# Patient Record
Sex: MALE | Race: BLACK OR AFRICAN AMERICAN | Employment: UNEMPLOYED | URBAN - METROPOLITAN AREA
[De-identification: names, ages, dates, MRNs, and addresses within clinical notes are randomized per-mention and may not be internally consistent; named-entity substitution may affect disease eponyms.]

---

## 2017-08-16 ENCOUNTER — TRANSFERRED RECORDS (OUTPATIENT)
Dept: HEALTH INFORMATION MANAGEMENT | Facility: CLINIC | Age: 57
End: 2017-08-16

## 2020-06-15 ENCOUNTER — ANCILLARY PROCEDURE (OUTPATIENT)
Dept: GENERAL RADIOLOGY | Facility: CLINIC | Age: 60
End: 2020-06-15
Attending: FAMILY MEDICINE
Payer: COMMERCIAL

## 2020-06-15 ENCOUNTER — OFFICE VISIT (OUTPATIENT)
Dept: ORTHOPEDICS | Facility: CLINIC | Age: 60
End: 2020-06-15
Payer: COMMERCIAL

## 2020-06-15 VITALS
WEIGHT: 248 LBS | SYSTOLIC BLOOD PRESSURE: 138 MMHG | DIASTOLIC BLOOD PRESSURE: 82 MMHG | BODY MASS INDEX: 36.73 KG/M2 | HEIGHT: 69 IN

## 2020-06-15 DIAGNOSIS — M17.0 PRIMARY OSTEOARTHRITIS OF BOTH KNEES: Primary | ICD-10-CM

## 2020-06-15 DIAGNOSIS — E66.01 MORBID OBESITY (H): ICD-10-CM

## 2020-06-15 DIAGNOSIS — M25.561 BILATERAL KNEE PAIN: ICD-10-CM

## 2020-06-15 DIAGNOSIS — M25.562 BILATERAL KNEE PAIN: ICD-10-CM

## 2020-06-15 PROCEDURE — 73562 X-RAY EXAM OF KNEE 3: CPT | Mod: 59 | Performed by: INTERNAL MEDICINE

## 2020-06-15 PROCEDURE — 20610 DRAIN/INJ JOINT/BURSA W/O US: CPT | Mod: 50 | Performed by: FAMILY MEDICINE

## 2020-06-15 PROCEDURE — 99204 OFFICE O/P NEW MOD 45 MIN: CPT | Mod: 25 | Performed by: FAMILY MEDICINE

## 2020-06-15 RX ORDER — TRIAMCINOLONE ACETONIDE 40 MG/ML
40 INJECTION, SUSPENSION INTRA-ARTICULAR; INTRAMUSCULAR
Status: DISCONTINUED | OUTPATIENT
Start: 2020-06-15 | End: 2022-12-12

## 2020-06-15 RX ORDER — ROPIVACAINE HYDROCHLORIDE 5 MG/ML
3 INJECTION, SOLUTION EPIDURAL; INFILTRATION; PERINEURAL
Status: DISCONTINUED | OUTPATIENT
Start: 2020-06-15 | End: 2022-12-13

## 2020-06-15 RX ADMIN — TRIAMCINOLONE ACETONIDE 40 MG: 40 INJECTION, SUSPENSION INTRA-ARTICULAR; INTRAMUSCULAR at 11:22

## 2020-06-15 RX ADMIN — ROPIVACAINE HYDROCHLORIDE 3 ML: 5 INJECTION, SOLUTION EPIDURAL; INFILTRATION; PERINEURAL at 11:22

## 2020-06-15 ASSESSMENT — MIFFLIN-ST. JEOR: SCORE: 1925.3

## 2020-06-15 NOTE — PROGRESS NOTES
HPI   [facility]   Clinic Visit s Nathanael 15, 2020    PCP: Faisal Hsu    ASSESSMENT/PLAN    ICD-10-CM    1. Primary osteoarthritis of both knees  M17.0    2. Morbid obesity (H)  E66.01      Reviewed nature of degenerative arthritis, discussed options including joint protection strategies and symptom management, including NSAIDS,  acetaminophen on a scheduled and/or as needed basis, joint injection with cortisone or hyaluronic acid derivatives, bracing, glucosamine, maintenance of overall knee stability through strengthening through physical therapy.  Pt opts for  symptom treatment, activity modification/rest and injection therapy.    Left worse than right knee    Declines physical therapy or unloading brace trial    Discussed lifestyles changes to reduce risks of arthritis progression:  Stop using tobacco if any current use.  Lose weight  Exercise safely.    Today's Visit:  Aashish is a 59 year old male who is seen as self referral for   Chief Complaint   Patient presents with     Right Knee - Pain     Left Knee - Pain       Injury: Reports insidious onset without acute precipitating event.  He reports chronic knee pain. He has had steroid injections in the past 1 year ago at [facility] for 6-8 months. His pain increases walking.    Location of Pain: bilateral knee anterior , nonradiating   Duration of Pain: chronic, worsening, 6 month(s),   Rating of Pain at worst: 8/10  Rating of Pain Currently: 4/10  Pain is better with: rest, ibuprofen and tylenol   Pain is worse with: walking   Treatment so far consists of: injection:  Cortisone  (most recent date: 1 year ago) that provided  6-8 month(s) of relief  Associated symptoms: swelling Mild  Recent imaging completed: No recent imaging completed.  Prior History of related problems: Chronic knee pain    Chart review indicates cortisone injections roughly yearly for the past few years.  He is also had a Eufflexxa trial, but he does not  recall the results of that.    Social History: retired       Patient Active Problem List    Diagnosis Date Noted     HYPERLIPIDEMIA LDL GOAL <130 10/31/2010     Priority: Medium     Benign neoplasm of colon 2006     Priority: Medium     HX of Benign Colon Polyps.       Esophageal reflux 2006     Priority: Medium     Insomnia 2006     Priority: Medium     Problem list name updated by automated process. Provider to review       Essential hypertension 2006     Priority: Medium     Problem list name updated by automated process. Provider to review       Obstructive sleep apnea 2006     Priority: Medium     Problem list name updated by automated process. Provider to review       Mixed hyperlipidemia 2003     Priority: Medium       Family History   Problem Relation Age of Onset     Diabetes Sister         adult onset     Diabetes Sister         adult onset     Hypertension Sister      Hypertension Sister      Hypertension Brother      Cancer Brother         brain cancer     Breast Cancer Sister         remission       Social History     Socioeconomic History     Marital status:      Spouse name: Not on file     Number of children: Not on file     Years of education: Not on file     Highest education level: Not on file   Occupational History     Not on file   Social Needs     Financial resource strain: Not on file     Food insecurity     Worry: Not on file     Inability: Not on file     Transportation needs     Medical: Not on file     Non-medical: Not on file   Tobacco Use     Smoking status: Former Smoker     Last attempt to quit: 2000     Years since quittin.1   Substance and Sexual Activity     Alcohol use: Yes     Comment: moderate     Drug use: No       Past Surgical History:   Procedure Laterality Date     C NONSPECIFIC PROCEDURE      Left knee arthroscopy -- Abstracted 02     C NONSPECIFIC PROCEDURE  02    Right knee arthroscopy -- Abstracted  "5/30/02             Review of Systems   Musculoskeletal: Positive for joint pain.   All other systems reviewed and are negative.        Physical Exam  Musculoskeletal:      Right knee: He exhibits no effusion.      Left knee: He exhibits no effusion.       /82   Ht 1.753 m (5' 9\")   Wt 112.5 kg (248 lb)   BMI 36.62 kg/m    Constitutional:well-developed, well-nourished, and in no distress.   Cardiovascular: Intact distal pulses.    Neurological: alert. Gait Abnormal:   Antalgic gait  Skin: Skin is warm and dry.   Psychiatric: Mood and affect normal.   Respiratory: unlabored, speaks in full sentences  Lymph: no LAD, no lymphangitis          Right Knee Exam     Tenderness   The patient is experiencing tenderness in the medial joint line.    Range of Motion   Extension: normal   Flexion:  120 abnormal     Tests   Salomón:  Medial - negative Lateral - negative  Varus: negative Valgus: negative  Drawer:  Anterior - negative    Posterior - negative  Patellar apprehension: negative    Other   Erythema: absent  Scars: absent  Sensation: normal  Pulse: present  Swelling: none  Effusion: no effusion present      Left Knee Exam     Tenderness   The patient is experiencing tenderness in the medial joint line.    Range of Motion   Extension: normal   Flexion:  120 abnormal     Tests   Salomón:  Medial - negative Lateral - negative  Varus: negative Valgus: negative  Drawer:  Anterior - negative     Posterior - negative  Patellar apprehension: negative    Other   Erythema: absent  Scars: absent  Sensation: normal  Pulse: present  Swelling: none  Effusion: no effusion present               X-ray images Ordered and independently reviewed by me in the office today with the patient. X-ray shows:   Recent Results (from the past 48 hour(s))   XR Knee Bilateral 3 vw    Narrative    6/15/2020    Advanced degenerative changes of the bilateral knees, with nearly complete   loss of the medial and patellofemoral joint spaces.  Also " noted is   bilateral tibial spine spurring and diffuse osteophytosis   tricompartmentally               Large Joint Injection/Arthocentesis: bilateral knee    Date/Time: 6/15/2020 11:22 AM  Performed by: Juan Jose Vegas MD  Authorized by: Juan Jose Vegas MD     Indications:  Osteoarthritis  Needle Size:  25 G  Guidance: landmark guided    Approach:  Anterolateral  Location:  Knee  Laterality:  Bilateral      Medications (Right):  40 mg triamcinolone 40 MG/ML; 3 mL ropivacaine 5 MG/ML   Medications (Right) comment:  4mL of ropivacaine were used  Medications (Left):  40 mg triamcinolone 40 MG/ML; 3 mL ropivacaine 5 MG/ML   Medications (Left) comment:  4mL of ropivacaine were used  Outcome:  Tolerated well, no immediate complications  Procedure discussed: discussed risks, benefits, and alternatives    Consent Given by:  Patient  Timeout: timeout called immediately prior to procedure    Prep: patient was prepped and draped in usual sterile fashion

## 2020-06-15 NOTE — LETTER
6/15/2020         RE: Aashish Aguilar  [address]  [place] [address]        Dear Colleague,    Thank you for referring your patient, Aashish Aguilar, to the  SPORTS MEDICINE. Please see a copy of my visit note below.    [facility]   Clinic Visit s Nathanael 15, 2020    PCP: Faisal Hsu    ASSESSMENT/PLAN    ICD-10-CM    1. Primary osteoarthritis of both knees  M17.0    2. Morbid obesity (H)  E66.01      Reviewed nature of degenerative arthritis, discussed options including joint protection strategies and symptom management, including NSAIDS,  acetaminophen on a scheduled and/or as needed basis, joint injection with cortisone or hyaluronic acid derivatives, bracing, glucosamine, maintenance of overall knee stability through strengthening through physical therapy.  Pt opts for  symptom treatment, activity modification/rest and injection therapy.    Left worse than right knee    Declines physical therapy or unloading brace trial    Discussed lifestyles changes to reduce risks of arthritis progression:  Stop using tobacco if any current use.  Lose weight  Exercise safely.    Today's Visit:  Aashish is a 59 year old male who is seen as self referral for   Chief Complaint   Patient presents with     Right Knee - Pain     Left Knee - Pain       Injury: Reports insidious onset without acute precipitating event.  He reports chronic knee pain. He has had steroid injections in the past 1 year ago at [facility] for 6-8 months. His pain increases walking.    Location of Pain: bilateral knee anterior , nonradiating   Duration of Pain: chronic, worsening, 6 month(s),   Rating of Pain at worst: 8/10  Rating of Pain Currently: 4/10  Pain is better with: rest, ibuprofen and tylenol   Pain is worse with: walking   Treatment so far consists of: injection:  Cortisone  (most recent date: 1 year ago) that provided  6-8 month(s) of relief  Associated symptoms: swelling Mild  Recent imaging  completed: No recent imaging completed.  Prior History of related problems: Chronic knee pain    Chart review indicates cortisone injections roughly yearly for the past few years.  He is also had a Eufflexxa trial, but he does not recall the results of that.    Social History: retired       Patient Active Problem List    Diagnosis Date Noted     HYPERLIPIDEMIA LDL GOAL <130 10/31/2010     Priority: Medium     Benign neoplasm of colon 2006     Priority: Medium     HX of Benign Colon Polyps.       Esophageal reflux 2006     Priority: Medium     Insomnia 2006     Priority: Medium     Problem list name updated by automated process. Provider to review       Essential hypertension 2006     Priority: Medium     Problem list name updated by automated process. Provider to review       Obstructive sleep apnea 2006     Priority: Medium     Problem list name updated by automated process. Provider to review       Mixed hyperlipidemia 2003     Priority: Medium       Family History   Problem Relation Age of Onset     Diabetes Sister         adult onset     Diabetes Sister         adult onset     Hypertension Sister      Hypertension Sister      Hypertension Brother      Cancer Brother         brain cancer     Breast Cancer Sister         remission       Social History     Socioeconomic History     Marital status:      Spouse name: Not on file     Number of children: Not on file     Years of education: Not on file     Highest education level: Not on file   Occupational History     Not on file   Social Needs     Financial resource strain: Not on file     Food insecurity     Worry: Not on file     Inability: Not on file     Transportation needs     Medical: Not on file     Non-medical: Not on file   Tobacco Use     Smoking status: Former Smoker     Last attempt to quit: 2000     Years since quittin.1   Substance and Sexual Activity     Alcohol use: Yes     Comment: moderate      "Drug use: No       Past Surgical History:   Procedure Laterality Date     C NONSPECIFIC PROCEDURE  1991    Left knee arthroscopy -- Abstracted 5/30/02     C NONSPECIFIC PROCEDURE  4/12/02    Right knee arthroscopy -- Abstracted 5/30/02             Review of Systems   Musculoskeletal: Positive for joint pain.   All other systems reviewed and are negative.        Physical Exam  Musculoskeletal:      Right knee: He exhibits no effusion.      Left knee: He exhibits no effusion.       /82   Ht 1.753 m (5' 9\")   Wt 112.5 kg (248 lb)   BMI 36.62 kg/m    Constitutional:well-developed, well-nourished, and in no distress.   Cardiovascular: Intact distal pulses.    Neurological: alert. Gait Abnormal:   Antalgic gait  Skin: Skin is warm and dry.   Psychiatric: Mood and affect normal.   Respiratory: unlabored, speaks in full sentences  Lymph: no LAD, no lymphangitis          Right Knee Exam     Tenderness   The patient is experiencing tenderness in the medial joint line.    Range of Motion   Extension: normal   Flexion:  120 abnormal     Tests   Salomón:  Medial - negative Lateral - negative  Varus: negative Valgus: negative  Drawer:  Anterior - negative    Posterior - negative  Patellar apprehension: negative    Other   Erythema: absent  Scars: absent  Sensation: normal  Pulse: present  Swelling: none  Effusion: no effusion present      Left Knee Exam     Tenderness   The patient is experiencing tenderness in the medial joint line.    Range of Motion   Extension: normal   Flexion:  120 abnormal     Tests   Salomón:  Medial - negative Lateral - negative  Varus: negative Valgus: negative  Drawer:  Anterior - negative     Posterior - negative  Patellar apprehension: negative    Other   Erythema: absent  Scars: absent  Sensation: normal  Pulse: present  Swelling: none  Effusion: no effusion present               X-ray images Ordered and independently reviewed by me in the office today with the patient. X-ray shows: "   Recent Results (from the past 48 hour(s))   XR Knee Bilateral 3 vw    Narrative    6/15/2020    Advanced degenerative changes of the bilateral knees, with nearly complete   loss of the medial and patellofemoral joint spaces.  Also noted is   bilateral tibial spine spurring and diffuse osteophytosis   tricompartmentally               Large Joint Injection/Arthocentesis: bilateral knee    Date/Time: 6/15/2020 11:22 AM  Performed by: Juan Jose Vegas MD  Authorized by: Juan Jose Vegas MD     Indications:  Osteoarthritis  Needle Size:  25 G  Guidance: landmark guided    Approach:  Anterolateral  Location:  Knee  Laterality:  Bilateral      Medications (Right):  40 mg triamcinolone 40 MG/ML; 3 mL ropivacaine 5 MG/ML   Medications (Right) comment:  4mL of ropivacaine were used  Medications (Left):  40 mg triamcinolone 40 MG/ML; 3 mL ropivacaine 5 MG/ML   Medications (Left) comment:  4mL of ropivacaine were used  Outcome:  Tolerated well, no immediate complications  Procedure discussed: discussed risks, benefits, and alternatives    Consent Given by:  Patient  Timeout: timeout called immediately prior to procedure    Prep: patient was prepped and draped in usual sterile fashion            Again, thank you for allowing me to participate in the care of your patient.        Sincerely,        Juan Jose Vegas MD

## 2020-09-01 ENCOUNTER — E-VISIT (OUTPATIENT)
Dept: FAMILY MEDICINE | Facility: CLINIC | Age: 60
End: 2020-09-01

## 2020-09-01 ENCOUNTER — VIRTUAL VISIT (OUTPATIENT)
Dept: FAMILY MEDICINE | Facility: CLINIC | Age: 60
End: 2020-09-01
Payer: COMMERCIAL

## 2020-09-01 DIAGNOSIS — E78.5 HYPERLIPIDEMIA LDL GOAL <130: ICD-10-CM

## 2020-09-01 DIAGNOSIS — Z53.9 ERRONEOUS ENCOUNTER--DISREGARD: Primary | ICD-10-CM

## 2020-09-01 DIAGNOSIS — I10 HYPERTENSION, UNSPECIFIED TYPE: Primary | ICD-10-CM

## 2020-09-01 DIAGNOSIS — G47.00 INSOMNIA, UNSPECIFIED TYPE: ICD-10-CM

## 2020-09-01 DIAGNOSIS — N52.9 ERECTILE DYSFUNCTION, UNSPECIFIED ERECTILE DYSFUNCTION TYPE: ICD-10-CM

## 2020-09-01 PROCEDURE — 99204 OFFICE O/P NEW MOD 45 MIN: CPT | Mod: 95 | Performed by: INTERNAL MEDICINE

## 2020-09-01 RX ORDER — LOSARTAN POTASSIUM 50 MG/1
50 TABLET ORAL DAILY
Qty: 90 TABLET | Refills: 3 | Status: SHIPPED | OUTPATIENT
Start: 2020-09-01 | End: 2021-06-01

## 2020-09-01 RX ORDER — TADALAFIL 20 MG/1
20 TABLET ORAL DAILY PRN
COMMUNITY
End: 2020-09-01

## 2020-09-01 RX ORDER — ZOLPIDEM TARTRATE 10 MG/1
10 TABLET ORAL
COMMUNITY
End: 2020-09-01 | Stop reason: ALTCHOICE

## 2020-09-01 RX ORDER — KETOCONAZOLE 20 MG/ML
SHAMPOO TOPICAL
COMMUNITY
Start: 2018-10-22 | End: 2021-06-01

## 2020-09-01 RX ORDER — PRAVASTATIN SODIUM 40 MG
40 TABLET ORAL DAILY
COMMUNITY
Start: 2019-10-09 | End: 2020-09-01

## 2020-09-01 RX ORDER — PRAVASTATIN SODIUM 40 MG
40 TABLET ORAL DAILY
COMMUNITY
End: 2020-09-01

## 2020-09-01 RX ORDER — KETOCONAZOLE 20 MG/G
CREAM TOPICAL
COMMUNITY
Start: 2018-10-22 | End: 2021-06-01

## 2020-09-01 RX ORDER — TRAZODONE HYDROCHLORIDE 50 MG/1
50 TABLET, FILM COATED ORAL AT BEDTIME
Qty: 20 TABLET | Refills: 1 | Status: SHIPPED | OUTPATIENT
Start: 2020-09-01 | End: 2021-10-25

## 2020-09-01 RX ORDER — TADALAFIL 20 MG/1
20 TABLET ORAL DAILY PRN
Qty: 10 TABLET | Refills: 1 | Status: SHIPPED | OUTPATIENT
Start: 2020-09-01 | End: 2021-06-01

## 2020-09-01 RX ORDER — LOSARTAN POTASSIUM AND HYDROCHLOROTHIAZIDE 12.5; 5 MG/1; MG/1
1 TABLET ORAL DAILY
COMMUNITY
End: 2020-09-01 | Stop reason: SINTOL

## 2020-09-01 RX ORDER — PRAVASTATIN SODIUM 40 MG
40 TABLET ORAL DAILY
Qty: 90 TABLET | Refills: 3 | Status: SHIPPED | OUTPATIENT
Start: 2020-09-01 | End: 2021-10-25

## 2020-09-01 RX ORDER — AMLODIPINE BESYLATE 2.5 MG/1
2.5 TABLET ORAL DAILY
Qty: 90 TABLET | Refills: 3 | Status: SHIPPED | OUTPATIENT
Start: 2020-09-01 | End: 2021-10-26

## 2020-09-01 NOTE — PROGRESS NOTES
"Aashish Aguilar is a 60 year old male who is being evaluated via a billable video visit.      The patient has been notified of following:     \"This video visit will be conducted via a call between you and your physician/provider. We have found that certain health care needs can be provided without the need for an in-person physical exam.  This service lets us provide the care you need with a video conversation.  If a prescription is necessary we can send it directly to your pharmacy.  If lab work is needed we can place an order for that and you can then stop by our lab to have the test done at a later time.    Video visits are billed at different rates depending on your insurance coverage.  Please reach out to your insurance provider with any questions.    If during the course of the call the physician/provider feels a video visit is not appropriate, you will not be charged for this service.\"    Patient has given verbal consent for Video visit? Yes  How would you like to obtain your AVS? TreSensahart  If you are dropped from the video visit, the video invite should be resent to: Text to cell phone: [facility] [phone]  Will anyone else be joining your video visit? No      Subjective     Aashish Aguilar is a 60 year old male who presents today via video visit for the following health issues:    HPI patient presents for today.  Overall he is doing relatively well.  He claims that there is some stress due to the fact that he lost his job in April patient works for [employer].  He stated that he was going to retire next year but has retired now due to job factors.  Struggling to stay active but he is to some extent.  Has good social outlets in regards to his wife and his children who are living at home currently.    Over the years he is struggled with hypertension, hyperlipidemia, erectile dysfunction, and insomnia.  He would like refills on her previous medications.    Recently his insurance changed and he was required to " seek out a new clinic.    Does mention that since initiating the blood pressure medication he has been more crampy.  Erectile dysfunction has been more of an issue since initiating blood pressure meds as well.    Does get some exercise.  He has significant osteoarthritis bilaterally so he is trying to keep active.  Does receive corticosteroid injections every 3 to 6 months.    New Patient/Transfer of Care  Medication refills     Video Start Time: 9:22 AM    New Patient/Transfer of Care    Review of Systems   Constitutional, HEENT, cardiovascular, pulmonary, gi and gu systems are negative, except as otherwise noted.      Objective           Vitals:  No vitals were obtained today due to virtual visit.    Physical Exam     GENERAL: Healthy, alert and no distress  EYES: Eyes grossly normal to inspection.  No discharge or erythema, or obvious scleral/conjunctival abnormalities.  .              Assessment & Plan     This is a patient who presents for new patient visit.  No significant complaints today.    Has a history of hypertension for which she currently is taking losartan hydrochlorothiazide.  Been complaining of muscular cramping and increased urination since the initiation of this medication.  We discussed stopping the hydrochlorothiazide component and replacing it with low dose amlodipine.  Therefore obstructing grams daily will be continued.  Hydrochlorothiazide will be discontinued.  And amlodipine 2.5 mg initiated.    Regarding hyperlipidemia, will continue the simvastatin.  This patient has a very high HDL level.  He is active.  When he returns to clinic in January we may institute Zetia dependent on what his repeat cholesterol levels are.    For erectile dysfunction he has been using Cialis with good result.  He uses 2-3 Cialis tablets per month typically.  His erectile dysfunction is temporally correlated to the initiation of blood pressure medications.    Patient has a history of osteoarthritis severe.   "Continue exercise and strengthening and cortisone injections.  If the cortisone is less effective, hyaluronic acid may be a stopgap prior to overt surgery.  He will follow-up with orthopedics in this regard.    Regarding the patient's insomnia.  There appears to be a condition component.  I would like to and if possible.  That utilization of PRN trazodone will be recommended instead.     BMI:   Estimated body mass index is 36.62 kg/m  as calculated from the following:    Height as of 6/15/20: 1.753 m (5' 9\").    Weight as of 6/15/20: 112.5 kg (248 lb).   Patient is working on diet and exercise for weight loss.        Work on weight loss    No follow-ups on file.    Justin Broussard MD  [facility]      Video-Visit Details    Type of service:  Video Visit    Video End Time:9:38AM AM    Originating Location (pt. Location): Home    Distant Location (provider location):  [facility]     Platform used for Video Visit: Doxkhang        "

## 2020-09-01 NOTE — Clinical Note
Please abstract the following data from this visit with this patient into the appropriate field in Epic:    Tests that can be patient reported without a hard copy:    Colonoscopy done on this date: 08/16/2017 (approximately), by this group: [facility]

## 2020-10-30 ENCOUNTER — TELEPHONE (OUTPATIENT)
Dept: ORTHOPEDICS | Facility: CLINIC | Age: 60
End: 2020-10-30
Payer: COMMERCIAL

## 2020-10-30 DIAGNOSIS — M17.0 PRIMARY OSTEOARTHRITIS OF BOTH KNEES: Primary | ICD-10-CM

## 2020-10-30 NOTE — TELEPHONE ENCOUNTER
Signed. He may want to reschedule the 11/4 appointment further out, in order to have time to see if PA is approved.

## 2020-10-30 NOTE — TELEPHONE ENCOUNTER
Patient scheduled for appointment on 11/4/20 for discussion of viscosupplementation injection vs steroid injection of bilateral knee.      Steroid  injection last completed 6/15/20.       Prior authorization referral for SynviscOne injection pended.     Please advise.    Sierra Dillon, ATC

## 2020-11-04 ENCOUNTER — OFFICE VISIT (OUTPATIENT)
Dept: ORTHOPEDICS | Facility: CLINIC | Age: 60
End: 2020-11-04
Payer: COMMERCIAL

## 2020-11-04 VITALS
HEIGHT: 69 IN | WEIGHT: 248 LBS | DIASTOLIC BLOOD PRESSURE: 102 MMHG | BODY MASS INDEX: 36.73 KG/M2 | SYSTOLIC BLOOD PRESSURE: 164 MMHG

## 2020-11-04 DIAGNOSIS — M17.0 PRIMARY OSTEOARTHRITIS OF BOTH KNEES: Primary | ICD-10-CM

## 2020-11-04 PROCEDURE — 20610 DRAIN/INJ JOINT/BURSA W/O US: CPT | Mod: 50 | Performed by: FAMILY MEDICINE

## 2020-11-04 PROCEDURE — 99213 OFFICE O/P EST LOW 20 MIN: CPT | Mod: 25 | Performed by: FAMILY MEDICINE

## 2020-11-04 RX ORDER — BETAMETHASONE SODIUM PHOSPHATE AND BETAMETHASONE ACETATE 3; 3 MG/ML; MG/ML
6 INJECTION, SUSPENSION INTRA-ARTICULAR; INTRALESIONAL; INTRAMUSCULAR; SOFT TISSUE
Status: DISCONTINUED | OUTPATIENT
Start: 2020-11-04 | End: 2022-12-13

## 2020-11-04 RX ADMIN — BETAMETHASONE SODIUM PHOSPHATE AND BETAMETHASONE ACETATE 6 MG: 3; 3 INJECTION, SUSPENSION INTRA-ARTICULAR; INTRALESIONAL; INTRAMUSCULAR; SOFT TISSUE at 10:54

## 2020-11-04 ASSESSMENT — MIFFLIN-ST. JEOR: SCORE: 1920.3

## 2020-11-04 NOTE — PROGRESS NOTES
[facility]   Follow-up Visit s Nov 4, 2020    PCP: Justin Broussard      Subjective:  Aashish is a 60 year old male who is seen in follow up for evaluation of   Chief Complaint   Patient presents with     Left Knee - RECHECK     Right Knee - RECHECK     His last visit was on 6/15/2020.  Since that time, symptoms have been gradually getting worse. Aashish Aguilar is accompanied today by self.     Patient had a bilateral knee Cortisone injection on 6/15/20 that provided partial relief, lasting for 3-4 month(s).   Patient has noticed improved symptoms with cortisone injections and bracing treatment.  Patient has no swelling  Pain is located bilateral medial knees, getting progressively worse.  Patient is using OTC knee braces  Patient denies any new injuries.    He was approved for viscosupplementation injections but has many questions before proceeding.  Patient's past medical, surgical, social and family histories are reviewed today.    History from previous visit     Today's Visit:  Aashish is a 59 year old male who is seen as self referral for   Chief Complaint   Patient presents with     Left Knee - RECHECK     Right Knee - RECHECK       Injury: Reports insidious onset without acute precipitating event.  He reports chronic knee pain. He has had steroid injections in the past 1 year ago at [facility] for 6-8 months. His pain increases walking.    Location of Pain: bilateral knee anterior , nonradiating   Duration of Pain: chronic, worsening, 6 month(s),   Rating of Pain at worst: 8/10  Rating of Pain Currently: 4/10  Pain is better with: rest, ibuprofen and tylenol   Pain is worse with: walking   Treatment so far consists of: injection:  Cortisone  (most recent date: 1 year ago) that provided  6-8 month(s) of relief  Associated symptoms: swelling Mild  Recent imaging completed: No recent imaging completed.  Prior History of related problems: Chronic knee pain    Chart review indicates cortisone  injections roughly yearly for the past few years.  He is also had a Eufflexxa trial, but he does not recall the results of that.    Social History: retired       Patient Active Problem List    Diagnosis Date Noted     Morbid obesity (H) 06/15/2020     Priority: Medium     Bilateral knee pain 06/15/2020     Priority: Medium     Primary osteoarthritis of both knees 06/15/2020     Priority: Medium     HYPERLIPIDEMIA LDL GOAL <130 10/31/2010     Priority: Medium     Benign neoplasm of colon 2006     Priority: Medium     HX of Benign Colon Polyps.       Esophageal reflux 2006     Priority: Medium     Insomnia 2006     Priority: Medium     Problem list name updated by automated process. Provider to review       Essential hypertension 2006     Priority: Medium     Problem list name updated by automated process. Provider to review       Obstructive sleep apnea 2006     Priority: Medium     Problem list name updated by automated process. Provider to review       Mixed hyperlipidemia 2003     Priority: Medium       Family History   Problem Relation Age of Onset     Diabetes Sister         adult onset     Diabetes Sister         adult onset     Hypertension Sister      Hypertension Sister      Hypertension Brother      Cancer Brother         brain cancer     Breast Cancer Sister         remission       Social History     Socioeconomic History     Marital status:      Spouse name: Not on file     Number of children: Not on file     Years of education: Not on file     Highest education level: Not on file   Occupational History     Not on file   Social Needs     Financial resource strain: Not on file     Food insecurity     Worry: Not on file     Inability: Not on file     Transportation needs     Medical: Not on file     Non-medical: Not on file   Tobacco Use     Smoking status: Former Smoker     Last attempt to quit: 2000     Years since quittin.1   Substance and Sexual  "Activity     Alcohol use: Yes     Comment: moderate     Drug use: No       Past Surgical History:   Procedure Laterality Date     [facility] NONSPECIFIC PROCEDURE  1991    Left knee arthroscopy -- Abstracted 5/30/02     [facility] NONSPECIFIC PROCEDURE  4/12/02    Right knee arthroscopy -- Abstracted 5/30/02             Review of Systems   Musculoskeletal: Positive for joint pain.   All other systems reviewed and are negative.        Physical Exam  Musculoskeletal:      Right knee: He exhibits no effusion.      Left knee: He exhibits no effusion.       BP (!) 164/102   Ht 1.753 m (5' 9\")   Wt 112.5 kg (248 lb)   BMI 36.62 kg/m    Constitutional:well-developed, well-nourished, and in no distress.   Cardiovascular: Intact distal pulses.    Neurological: alert. Gait Abnormal:   Antalgic gait  Skin: Skin is warm and dry.   Psychiatric: Mood and affect normal.   Respiratory: unlabored, speaks in full sentences  Lymph: no LAD, no lymphangitis          Right Knee Exam     Tenderness   The patient is experiencing tenderness in the medial joint line.    Range of Motion   Extension: normal   Flexion:  120 abnormal     Tests   Salom[place]:  Medial - negative Lateral - negative  Varus: negative Valgus: negative  Drawer:  Anterior - negative    Posterior - negative  Patellar apprehension: negative    Other   Erythema: absent  Scars: absent  Sensation: normal  Pulse: present  Swelling: none  Effusion: no effusion present      Left Knee Exam     Tenderness   The patient is experiencing tenderness in the medial joint line.    Range of Motion   Extension: normal   Flexion:  120 abnormal     Tests   Salom[place]:  Medial - negative Lateral - negative  Varus: negative Valgus: negative  Drawer:  Anterior - negative     Posterior - negative  Patellar apprehension: negative    Other   Erythema: absent  Scars: absent  Sensation: normal  Pulse: present  Swelling: none  Effusion: no effusion present               X-ray images previously Ordered and " independently reviewed by me in the office today with the patient. X-ray shows:     Results for orders placed or performed in visit on 06/15/20   XR Knee Bilateral 3 vw    Narrative    6/15/2020    Advanced degenerative changes of the bilateral knees, with nearly complete   loss of the medial and patellofemoral joint spaces.  Also noted is   bilateral tibial spine spurring and diffuse osteophytosis   tricompartmentally       ASSESSMENT/PLAN    ICD-10-CM    1. Primary osteoarthritis of both knees  M17.0 Large Joint Injection/Arthocentesis: bilateral knee     Advanced bilateral knee arthritis.  I suspect he will need a knee replacement at some point in the future.  He desires to put that off for 5 years though I am not sure this is achievable.  We had a lengthy discussion about the nature of viscosupplementation, and he is not comfortable about the benefit, and therefore opted to proceed with bilateral knee cortisone injections once again.  These could be repeated up to 3 times a year, at least 3 months apart.  I recommended physical therapy but he declined for now but may call for this if desired.  He also would be a good candidate for unloading braces, which could be fitted through physical therapy as well.          Large Joint Injection/Arthocentesis: bilateral knee    Date/Time: 11/4/2020 10:54 AM  Performed by: Juan Jose Vegas MD  Authorized by: Juan Jose Vegas MD     Indications:  Pain and osteoarthritis  Needle Size:  25 G  Guidance: landmark guided    Approach:  Anterolateral  Location:  Knee  Laterality:  Bilateral      Medications (Right):  6 mg betamethasone acet & sod phos 6 (3-3) MG/ML  Medications (Left):  6 mg betamethasone acet & sod phos 6 (3-3) MG/ML  Outcome:  Tolerated well, no immediate complications  Procedure discussed: discussed risks, benefits, and alternatives    Consent Given by:  Patient  Timeout: timeout called immediately prior to procedure    Prep: patient was prepped  and draped in usual sterile fashion          Time spent in one-on-one evalution and discussion with patient regarding nature of problem, course, prior treatments, and therapeutic options, at least 50% of which was spent in counseling and coordination of care: 15 minutes, over and above time spent on injection.

## 2020-11-04 NOTE — LETTER
11/4/2020         RE: Aashish Aguilar  [address]  [place] [address]        Dear Colleague,    Thank you for referring your patient, Aashish Aguilar, to the [facility] [place]. Please see a copy of my visit note below.    [facility]   Follow-up Visit s Nov 4, 2020    PCP: Justin Broussard      Subjective:  Aashish is a 60 year old male who is seen in follow up for evaluation of   Chief Complaint   Patient presents with     Left Knee - RECHECK     Right Knee - RECHECK     His last visit was on 6/15/2020.  Since that time, symptoms have been gradually getting worse. Aashish Aguilar is accompanied today by self.     Patient had a bilateral knee Cortisone injection on 6/15/20 that provided partial relief, lasting for 3-4 month(s).   Patient has noticed improved symptoms with cortisone injections and bracing treatment.  Patient has no swelling  Pain is located bilateral medial knees, getting progressively worse.  Patient is using OTC knee braces  Patient denies any new injuries.    He was approved for viscosupplementation injections but has many questions before proceeding.  Patient's past medical, surgical, social and family histories are reviewed today.    History from previous visit     Today's Visit:  Aashish is a 59 year old male who is seen as self referral for   Chief Complaint   Patient presents with     Left Knee - RECHECK     Right Knee - RECHECK       Injury: Reports insidious onset without acute precipitating event.  He reports chronic knee pain. He has had steroid injections in the past 1 year ago at [facility] for 6-8 months. His pain increases walking.    Location of Pain: bilateral knee anterior , nonradiating   Duration of Pain: chronic, worsening, 6 month(s),   Rating of Pain at worst: 8/10  Rating of Pain Currently: 4/10  Pain is better with: rest, ibuprofen and tylenol   Pain is worse with: walking   Treatment so far consists of: injection:  Cortisone   (most recent date: 1 year ago) that provided  6-8 month(s) of relief  Associated symptoms: swelling Mild  Recent imaging completed: No recent imaging completed.  Prior History of related problems: Chronic knee pain    Chart review indicates cortisone injections roughly yearly for the past few years.  He is also had a Eufflexxa trial, but he does not recall the results of that.    Social History: retired       Patient Active Problem List    Diagnosis Date Noted     Morbid obesity (H) 06/15/2020     Priority: Medium     Bilateral knee pain 06/15/2020     Priority: Medium     Primary osteoarthritis of both knees 06/15/2020     Priority: Medium     HYPERLIPIDEMIA LDL GOAL <130 10/31/2010     Priority: Medium     Benign neoplasm of colon 06/19/2006     Priority: Medium     HX of Benign Colon Polyps.       Esophageal reflux 06/01/2006     Priority: Medium     Insomnia 06/01/2006     Priority: Medium     Problem list name updated by automated process. Provider to review       Essential hypertension 03/23/2006     Priority: Medium     Problem list name updated by automated process. Provider to review       Obstructive sleep apnea 03/23/2006     Priority: Medium     Problem list name updated by automated process. Provider to review       Mixed hyperlipidemia 04/16/2003     Priority: Medium       Family History   Problem Relation Age of Onset     Diabetes Sister         adult onset     Diabetes Sister         adult onset     Hypertension Sister      Hypertension Sister      Hypertension Brother      Cancer Brother         brain cancer     Breast Cancer Sister         remission       Social History     Socioeconomic History     Marital status:      Spouse name: Not on file     Number of children: Not on file     Years of education: Not on file     Highest education level: Not on file   Occupational History     Not on file   Social Needs     Financial resource strain: Not on file     Food insecurity     Worry: Not on  "file     Inability: Not on file     Transportation needs     Medical: Not on file     Non-medical: Not on file   Tobacco Use     Smoking status: Former Smoker     Last attempt to quit: 2000     Years since quittin.1   Substance and Sexual Activity     Alcohol use: Yes     Comment: moderate     Drug use: No       Past Surgical History:   Procedure Laterality Date     [facility] NONSPECIFIC PROCEDURE      Left knee arthroscopy -- Abstracted 02     [facility] NONSPECIFIC PROCEDURE  02    Right knee arthroscopy -- Abstracted 02             Review of Systems   Musculoskeletal: Positive for joint pain.   All other systems reviewed and are negative.        Physical Exam  Musculoskeletal:      Right knee: He exhibits no effusion.      Left knee: He exhibits no effusion.       BP (!) 164/102   Ht 1.753 m (5' 9\")   Wt 112.5 kg (248 lb)   BMI 36.62 kg/m    Constitutional:well-developed, well-nourished, and in no distress.   Cardiovascular: Intact distal pulses.    Neurological: alert. Gait Abnormal:   Antalgic gait  Skin: Skin is warm and dry.   Psychiatric: Mood and affect normal.   Respiratory: unlabored, speaks in full sentences  Lymph: no LAD, no lymphangitis          Right Knee Exam     Tenderness   The patient is experiencing tenderness in the medial joint line.    Range of Motion   Extension: normal   Flexion:  120 abnormal     Tests   [place]om[place]:  Medial - negative Lateral - negative  Varus: negative Valgus: negative  Drawer:  Anterior - negative    Posterior - negative  Patellar apprehension: negative    Other   Erythema: absent  Scars: absent  Sensation: normal  Pulse: present  Swelling: none  Effusion: no effusion present      Left Knee Exam     Tenderness   The patient is experiencing tenderness in the medial joint line.    Range of Motion   Extension: normal   Flexion:  120 abnormal     Tests   [place]:  Medial - negative Lateral - negative  Varus: negative Valgus: negative  Drawer:  Anterior - " negative     Posterior - negative  Patellar apprehension: negative    Other   Erythema: absent  Scars: absent  Sensation: normal  Pulse: present  Swelling: none  Effusion: no effusion present               X-ray images previously Ordered and independently reviewed by me in the office today with the patient. X-ray shows:     Results for orders placed or performed in visit on 06/15/20   XR Knee Bilateral 3 vw    Narrative    6/15/2020    Advanced degenerative changes of the bilateral knees, with nearly complete   loss of the medial and patellofemoral joint spaces.  Also noted is   bilateral tibial spine spurring and diffuse osteophytosis   tricompartmentally       ASSESSMENT/PLAN    ICD-10-CM    1. Primary osteoarthritis of both knees  M17.0 Large Joint Injection/Arthocentesis: bilateral knee     Advanced bilateral knee arthritis.  I suspect he will need a knee replacement at some point in the future.  He desires to put that off for 5 years though I am not sure this is achievable.  We had a lengthy discussion about the nature of viscosupplementation, and he is not comfortable about the benefit, and therefore opted to proceed with bilateral knee cortisone injections once again.  These could be repeated up to 3 times a year, at least 3 months apart.  I recommended physical therapy but he declined for now but may call for this if desired.  He also would be a good candidate for unloading braces, which could be fitted through physical therapy as well.          Large Joint Injection/Arthocentesis: bilateral knee    Date/Time: 11/4/2020 10:54 AM  Performed by: Juan Jose Vegas MD  Authorized by: Juan Jose Vegas MD     Indications:  Pain and osteoarthritis  Needle Size:  25 G  Guidance: landmark guided    Approach:  Anterolateral  Location:  Knee  Laterality:  Bilateral      Medications (Right):  6 mg betamethasone acet & sod phos 6 (3-3) MG/ML  Medications (Left):  6 mg betamethasone acet & sod phos 6 (3-3)  MG/ML  Outcome:  Tolerated well, no immediate complications  Procedure discussed: discussed risks, benefits, and alternatives    Consent Given by:  Patient  Timeout: timeout called immediately prior to procedure    Prep: patient was prepped and draped in usual sterile fashion          Time spent in one-on-one evalution and discussion with patient regarding nature of problem, course, prior treatments, and therapeutic options, at least 50% of which was spent in counseling and coordination of care: 15 minutes, over and above time spent on injection.        Again, thank you for allowing me to participate in the care of your patient.        Sincerely,        Juan Jose Vegas MD

## 2021-01-15 ENCOUNTER — HEALTH MAINTENANCE LETTER (OUTPATIENT)
Age: 61
End: 2021-01-15

## 2021-03-17 ENCOUNTER — IMMUNIZATION (OUTPATIENT)
Dept: NURSING | Facility: CLINIC | Age: 61
End: 2021-03-17
Payer: COMMERCIAL

## 2021-03-17 PROCEDURE — 0001A PR COVID VAC PFIZER DIL RECON 30 MCG/0.3 ML IM: CPT

## 2021-03-17 PROCEDURE — 91300 PR COVID VAC PFIZER DIL RECON 30 MCG/0.3 ML IM: CPT

## 2021-03-20 NOTE — TELEPHONE ENCOUNTER
PA approved from 10/30/20 - 12/31/20.    Note    Insurance name: [facility] [employer]   Location: [place]  Ded $: 5900  % covered: 70    OOP$: [id number]        Sierra Dillon ATC     EKG normal sinus rhythm.   Ilsa Palmer MD CXR neg. US abd neg for GB and spleen. Pain improved with motrin, will dc home and have patient follow up with cardiology. Given strict instructions to return.  Ilsa Palmer MD

## 2021-04-07 ENCOUNTER — IMMUNIZATION (OUTPATIENT)
Dept: NURSING | Facility: CLINIC | Age: 61
End: 2021-04-07
Attending: INTERNAL MEDICINE
Payer: COMMERCIAL

## 2021-04-07 PROCEDURE — 91300 PR COVID VAC PFIZER DIL RECON 30 MCG/0.3 ML IM: CPT

## 2021-04-07 PROCEDURE — 0002A PR COVID VAC PFIZER DIL RECON 30 MCG/0.3 ML IM: CPT

## 2021-06-01 ENCOUNTER — OFFICE VISIT (OUTPATIENT)
Dept: FAMILY MEDICINE | Facility: CLINIC | Age: 61
End: 2021-06-01
Payer: COMMERCIAL

## 2021-06-01 VITALS
TEMPERATURE: 97.5 F | OXYGEN SATURATION: 99 % | HEART RATE: 79 BPM | BODY MASS INDEX: 33.03 KG/M2 | SYSTOLIC BLOOD PRESSURE: 132 MMHG | WEIGHT: 223 LBS | HEIGHT: 69 IN | DIASTOLIC BLOOD PRESSURE: 82 MMHG

## 2021-06-01 DIAGNOSIS — Z00.00 ENCOUNTER FOR PREVENTIVE HEALTH EXAMINATION: Primary | ICD-10-CM

## 2021-06-01 DIAGNOSIS — Z11.4 SCREENING FOR HIV (HUMAN IMMUNODEFICIENCY VIRUS): ICD-10-CM

## 2021-06-01 DIAGNOSIS — Z11.59 NEED FOR HEPATITIS C SCREENING TEST: ICD-10-CM

## 2021-06-01 DIAGNOSIS — Z13.220 SCREENING FOR HYPERLIPIDEMIA: ICD-10-CM

## 2021-06-01 PROCEDURE — 99396 PREV VISIT EST AGE 40-64: CPT | Performed by: INTERNAL MEDICINE

## 2021-06-01 SDOH — HEALTH STABILITY: MENTAL HEALTH: HOW OFTEN DO YOU HAVE A DRINK CONTAINING ALCOHOL?: NOT ASKED

## 2021-06-01 SDOH — HEALTH STABILITY: MENTAL HEALTH: HOW OFTEN DO YOU HAVE 6 OR MORE DRINKS ON ONE OCCASION?: NOT ASKED

## 2021-06-01 SDOH — HEALTH STABILITY: MENTAL HEALTH: HOW MANY STANDARD DRINKS CONTAINING ALCOHOL DO YOU HAVE ON A TYPICAL DAY?: NOT ASKED

## 2021-06-01 ASSESSMENT — MIFFLIN-ST. JEOR: SCORE: 1806.9

## 2021-06-01 NOTE — PROGRESS NOTES
{PROVIDER CHARTING PREFERENCE:010842}    José Miguel Zendejas is a 60 year old who presents for the following health issues {ACCOMPANIED BY STATEMENT (Optional):427984}    HPI     {SUPERLIST (Optional):267006}  {additonal problems for provider to add (Optional):934869}    Review of Systems   {ROS COMP (Optional):775825}      Objective    There were no vitals taken for this visit.  There is no height or weight on file to calculate BMI.  Physical Exam   {Exam List (Optional):105968}    {Diagnostic Test Results (Optional):654832}    {AMBULATORY ATTESTATION (Optional):739941}

## 2021-06-01 NOTE — PROGRESS NOTES
SUBJECTIVE:   CC: Aashish Aguilar is an 60 year old male who presents for preventative health visit.  Overall he is doing well.  No chest pain shortness of breath bowel or urinary symptoms.  Does have a history of hyperlipidemia.  Has lost 30 pounds with diet and exercise.    Patient has fairly severe bilateral DJD of his knees.  His left knee is worse than his right.  He continues to perform activities of daily living and they are not causing significant detriment in these activities.  Cortisone injections every 3 months have been helpful.    No endocrine hematologic or allergic symptoms.  His sleep has improved.  He does have a history of obstructive sleep apnea.  Again he has lost over 30 pounds with diet and exercise.      Patient has been advised of split billing requirements and indicates understanding: Yes  Healthy Habits:     Getting at least 3 servings of Calcium per day:  Yes    Bi-annual eye exam:  Yes    Dental care twice a year:  Yes    Sleep apnea or symptoms of sleep apnea:  None    Diet:  Regular (no restrictions)    Frequency of exercise:  6-7 days/week    Duration of exercise:  45-60 minutes    Taking medications regularly:  Yes    Barriers to taking medications:  None    Medication side effects:  None    PHQ-2 Total Score: 0    Additional concerns today:  Yes              Today's PHQ-2 Score:   PHQ-2 ( 1999 [employer]) 6/1/2021   Q1: Little interest or pleasure in doing things 0   Q2: Feeling down, depressed or hopeless 0   PHQ-2 Score 0       Abuse: Current or Past(Physical, Sexual or Emotional)- No  Do you feel safe in your environment? Yes    Have you ever done Advance Care Planning? (For example, a Health Directive, POLST, or a discussion with a medical provider or your loved ones about your wishes): No, advance care planning information given to patient to review.  Patient plans to discuss their wishes with loved ones or provider.      Social History     Tobacco Use     Smoking status: Former Smoker      Quit date: 2000     Years since quittin.1     Smokeless tobacco: Never Used   Substance Use Topics     Alcohol use: Yes     Comment: 4 drinks per week     If you drink alcohol do you typically have >3 drinks per day or >7 drinks per week? No      Last PSA:   PSA   Date Value Ref Range Status   2009 1.03 0 - 4 ug/L Final       Reviewed orders with patient. Reviewed health maintenance and updated orders accordingly - Yes  Lab work is in process    Reviewed and updated as needed this visit by clinical staff   Allergies  Meds              Reviewed and updated as needed this visit by Provider                No past medical history on file.     Review of Systems  CONSTITUTIONAL: NEGATIVE for fever, chills, change in weight  INTEGUMENTARY/SKIN: NEGATIVE for worrisome rashes, moles or lesions  EYES: NEGATIVE for vision changes or irritation  ENT: NEGATIVE for ear, mouth and throat problems  RESP: NEGATIVE for significant cough or SOB  CV: NEGATIVE for chest pain, palpitations or peripheral edema  GI: NEGATIVE for nausea, abdominal pain, heartburn, or change in bowel habits   male: negative for dysuria, hematuria, decreased urinary stream, erectile dysfunction, urethral discharge  MUSCULOSKELETAL: NEGATIVE for significant arthralgias or myalgia  NEURO: NEGATIVE for weakness, dizziness or paresthesias  PSYCHIATRIC: NEGATIVE for changes in mood or affect    OBJECTIVE:   There were no vitals taken for this visit.    Physical Exam  GENERAL: healthy, alert and no distress  EYES: Eyes grossly normal to inspection, PERRL and conjunctivae and sclerae normal  HENT: ear canals and TM's normal, nose and mouth without ulcers or lesions  NECK: no adenopathy, no asymmetry, masses, or scars and thyroid normal to palpation  RESP: lungs clear to auscultation - no rales, rhonchi or wheezes  CV: regular rate and rhythm, normal S1 S2, no S3 or S4, no murmur, click or rub, no peripheral edema and peripheral pulses  "strong  ABDOMEN: soft, nontender, no hepatosplenomegaly, no masses and bowel sounds normal  MS: no gross musculoskeletal defects noted, no edema  SKIN: no suspicious lesions or rashes  NEURO: Normal strength and tone, mentation intact and speech normal  PSYCH: mentation appears normal, affect normal/bright    Diagnostic Test Results:  Labs reviewed in Epic    ASSESSMENT/PLAN:       ICD-10-CM    1. Encounter for preventive health examination  Z00.00 HIV Antigen Antibody Combo     Lipid panel reflex to direct LDL Fasting     Comprehensive metabolic panel (BMP + Alb, Alk Phos, ALT, AST, Total. Bili, TP)     CBC with platelets and differential     TSH with free T4 reflex     PSA, screen   2. Screening for HIV (human immunodeficiency virus)  Z11.4    3. Need for hepatitis C screening test  Z11.59 Hepatitis C Screen Reflex to HCV RNA Quant and Genotype   4. Screening for hyperlipidemia  Z13.220        Patient has been advised of split billing requirements and indicates understanding: Yes  COUNSELING:   Reviewed preventive health counseling, as reflected in patient instructions    Estimated body mass index is 36.62 kg/m  as calculated from the following:    Height as of 11/4/20: 1.753 m (5' 9\").    Weight as of 11/4/20: 112.5 kg (248 lb).     Patient working on diet and exercise.  Has lost 32 pounds.  Continues this program.    He reports that he quit smoking about 21 years ago. He has never used smokeless tobacco.      Counseling Resources:  ATP IV Guidelines  Pooled Cohorts Equation Calculator  FRAX Risk Assessment  ICSI Preventive Guidelines  Dietary Guidelines for Americans, 2010  USDA's MyPlate  ASA Prophylaxis  Lung CA Screening    Justin Broussard MD  [facility]  "

## 2021-06-01 NOTE — PATIENT INSTRUCTIONS
Very nice to meet you.    I have put in orders for all of your labs.  You can come into the clinic within the week.  Just call ahead so that they can make a time for the blood draw.    I would be happy to refill your medications as needed.    If you need a preop for later on this fall, I would be happy to perform it.    Best regards  Justin

## 2021-06-07 DIAGNOSIS — Z11.59 NEED FOR HEPATITIS C SCREENING TEST: ICD-10-CM

## 2021-06-07 DIAGNOSIS — Z00.00 ENCOUNTER FOR PREVENTIVE HEALTH EXAMINATION: ICD-10-CM

## 2021-06-07 LAB
ALBUMIN SERPL-MCNC: 4.1 G/DL (ref 3.4–5)
ALP SERPL-CCNC: 102 U/L (ref 40–150)
ALT SERPL W P-5'-P-CCNC: 44 U/L (ref 0–70)
ANION GAP SERPL CALCULATED.3IONS-SCNC: 5 MMOL/L (ref 3–14)
AST SERPL W P-5'-P-CCNC: 31 U/L (ref 0–45)
BASOPHILS # BLD AUTO: 0 10E9/L (ref 0–0.2)
BASOPHILS NFR BLD AUTO: 0.2 %
BILIRUB SERPL-MCNC: 0.7 MG/DL (ref 0.2–1.3)
BUN SERPL-MCNC: 16 MG/DL (ref 7–30)
CALCIUM SERPL-MCNC: 9.3 MG/DL (ref 8.5–10.1)
CHLORIDE SERPL-SCNC: 106 MMOL/L (ref 94–109)
CHOLEST SERPL-MCNC: 243 MG/DL
CO2 SERPL-SCNC: 27 MMOL/L (ref 20–32)
CREAT SERPL-MCNC: 1.02 MG/DL (ref 0.66–1.25)
DIFFERENTIAL METHOD BLD: ABNORMAL
EOSINOPHIL # BLD AUTO: 0 10E9/L (ref 0–0.7)
EOSINOPHIL NFR BLD AUTO: 0.2 %
ERYTHROCYTE [DISTWIDTH] IN BLOOD BY AUTOMATED COUNT: 13.8 % (ref 10–15)
GFR SERPL CREATININE-BSD FRML MDRD: 78 ML/MIN/{1.73_M2}
GLUCOSE SERPL-MCNC: 105 MG/DL (ref 70–99)
HCT VFR BLD AUTO: 42.3 % (ref 40–53)
HCV AB SERPL QL IA: NONREACTIVE
HDLC SERPL-MCNC: 101 MG/DL
HGB BLD-MCNC: 14.9 G/DL (ref 13.3–17.7)
HIV 1+2 AB+HIV1 P24 AG SERPL QL IA: NONREACTIVE
LDLC SERPL CALC-MCNC: 132 MG/DL
LYMPHOCYTES # BLD AUTO: 1.3 10E9/L (ref 0.8–5.3)
LYMPHOCYTES NFR BLD AUTO: 28.3 %
MCH RBC QN AUTO: 33.6 PG (ref 26.5–33)
MCHC RBC AUTO-ENTMCNC: 35.2 G/DL (ref 31.5–36.5)
MCV RBC AUTO: 95 FL (ref 78–100)
MONOCYTES # BLD AUTO: 0.4 10E9/L (ref 0–1.3)
MONOCYTES NFR BLD AUTO: 9.4 %
NEUTROPHILS # BLD AUTO: 2.8 10E9/L (ref 1.6–8.3)
NEUTROPHILS NFR BLD AUTO: 61.9 %
NONHDLC SERPL-MCNC: 142 MG/DL
PLATELET # BLD AUTO: 262 10E9/L (ref 150–450)
POTASSIUM SERPL-SCNC: 4.6 MMOL/L (ref 3.4–5.3)
PROT SERPL-MCNC: 7.5 G/DL (ref 6.8–8.8)
RBC # BLD AUTO: 4.44 10E12/L (ref 4.4–5.9)
SODIUM SERPL-SCNC: 138 MMOL/L (ref 133–144)
TRIGL SERPL-MCNC: 51 MG/DL
TSH SERPL DL<=0.005 MIU/L-ACNC: 1.09 MU/L (ref 0.4–4)
WBC # BLD AUTO: 4.5 10E9/L (ref 4–11)

## 2021-06-07 PROCEDURE — 80050 GENERAL HEALTH PANEL: CPT | Performed by: INTERNAL MEDICINE

## 2021-06-07 PROCEDURE — 36415 COLL VENOUS BLD VENIPUNCTURE: CPT | Performed by: INTERNAL MEDICINE

## 2021-06-07 PROCEDURE — 86803 HEPATITIS C AB TEST: CPT | Performed by: INTERNAL MEDICINE

## 2021-06-07 PROCEDURE — G0103 PSA SCREENING: HCPCS | Performed by: INTERNAL MEDICINE

## 2021-06-07 PROCEDURE — 80061 LIPID PANEL: CPT | Performed by: INTERNAL MEDICINE

## 2021-06-07 PROCEDURE — 87389 HIV-1 AG W/HIV-1&-2 AB AG IA: CPT | Performed by: INTERNAL MEDICINE

## 2021-06-08 LAB — PSA SERPL-ACNC: 2.17 UG/L (ref 0–4)

## 2021-06-21 ENCOUNTER — OFFICE VISIT (OUTPATIENT)
Dept: FAMILY MEDICINE | Facility: CLINIC | Age: 61
End: 2021-06-21
Payer: COMMERCIAL

## 2021-06-21 VITALS
SYSTOLIC BLOOD PRESSURE: 146 MMHG | DIASTOLIC BLOOD PRESSURE: 87 MMHG | TEMPERATURE: 98.2 F | HEIGHT: 69 IN | BODY MASS INDEX: 32.78 KG/M2 | WEIGHT: 221.3 LBS | HEART RATE: 78 BPM | OXYGEN SATURATION: 98 %

## 2021-06-21 DIAGNOSIS — H92.02 LEFT EAR PAIN: Primary | ICD-10-CM

## 2021-06-21 PROCEDURE — 99213 OFFICE O/P EST LOW 20 MIN: CPT | Performed by: NURSE PRACTITIONER

## 2021-06-21 ASSESSMENT — MIFFLIN-ST. JEOR: SCORE: 1794.19

## 2021-06-21 NOTE — PROGRESS NOTES
"    Assessment & Plan   Problem List Items Addressed This Visit     None      Visit Diagnoses     Left ear pain    -  Primary    Relevant Orders    OTOLARYNGOLOGY REFERRAL           No evidence for internal or external ear infection. Unknown  Etiology at this time. He does have nasal mucosa swelling so he could try steroid nasal spray for a couple weeks. Referred to ENT should this persist or worsen       MYRNA Rodriguez CNP  [facility] KWASI Zendejas is a 61 year old who presents for the following health issues     HPI   Left ear ache pain x 2 wks  Has been swimming a lot   Just below Ear is tender to the touch   No fevers, ear drainage  First thing in the morning the jaw is a little touchy with opening but it resolves quickly    No throat symptoms       Review of Systems   Detailed as above         Objective    BP (!) 146/87 (BP Location: Right arm, Cuff Size: Adult Large)   Pulse 78   Temp 98.2  F (36.8  C) (Tympanic)   Ht 1.753 m (5' 9\")   Wt 100.4 kg (221 lb 4.8 oz)   SpO2 98%   BMI 32.68 kg/m    There is no height or weight on file to calculate BMI.  Physical Exam  Constitutional:       Appearance: Normal appearance.   HENT:      Head:      Comments: Tenderness at left mandibular angle     Right Ear: Tympanic membrane, ear canal and external ear normal.      Left Ear: Tympanic membrane, ear canal and external ear normal.      Nose: Congestion present.      Mouth/Throat:      Mouth: Mucous membranes are moist.      Pharynx: Oropharynx is clear.   Eyes:      Conjunctiva/sclera: Conjunctivae normal.   Pulmonary:      Effort: Pulmonary effort is normal.   Lymphadenopathy:      Cervical: No cervical adenopathy.   Skin:     General: Skin is warm and dry.   Neurological:      Mental Status: He is alert.   Psychiatric:         Mood and Affect: Mood normal.                "

## 2021-10-21 DIAGNOSIS — I10 HYPERTENSION, UNSPECIFIED TYPE: ICD-10-CM

## 2021-10-21 DIAGNOSIS — E78.5 HYPERLIPIDEMIA LDL GOAL <130: ICD-10-CM

## 2021-10-21 DIAGNOSIS — G47.00 INSOMNIA, UNSPECIFIED TYPE: ICD-10-CM

## 2021-10-24 ENCOUNTER — HEALTH MAINTENANCE LETTER (OUTPATIENT)
Age: 61
End: 2021-10-24

## 2021-10-25 RX ORDER — TRAZODONE HYDROCHLORIDE 50 MG/1
TABLET, FILM COATED ORAL
Qty: 20 TABLET | Refills: 1 | Status: SHIPPED | OUTPATIENT
Start: 2021-10-25 | End: 2022-08-31

## 2021-10-25 RX ORDER — PRAVASTATIN SODIUM 40 MG
TABLET ORAL
Qty: 90 TABLET | Refills: 1 | Status: SHIPPED | OUTPATIENT
Start: 2021-10-25 | End: 2022-08-31

## 2021-10-25 NOTE — TELEPHONE ENCOUNTER
Routing refill request to provider for review/approval because:  Failed BP protocol    Natasha BOURGEOIS RN  EP Triage

## 2021-10-25 NOTE — TELEPHONE ENCOUNTER
Prescription approved per [facility] Refill Protocol.    Natasha BOURGEOIS RN  EP Triage

## 2021-10-26 RX ORDER — AMLODIPINE BESYLATE 2.5 MG/1
TABLET ORAL
Qty: 90 TABLET | Refills: 3 | Status: SHIPPED | OUTPATIENT
Start: 2021-10-26 | End: 2022-04-05

## 2022-04-05 ENCOUNTER — NURSE TRIAGE (OUTPATIENT)
Dept: FAMILY MEDICINE | Facility: CLINIC | Age: 62
End: 2022-04-05
Payer: COMMERCIAL

## 2022-04-05 DIAGNOSIS — I10 HYPERTENSION, UNSPECIFIED TYPE: ICD-10-CM

## 2022-04-05 RX ORDER — AMLODIPINE BESYLATE 2.5 MG/1
5 TABLET ORAL DAILY
COMMUNITY
Start: 2022-04-05 | End: 2022-05-16

## 2022-04-05 NOTE — TELEPHONE ENCOUNTER
"Patient reports having BP medication change in 6/2021 and his not seen any difference in BP readings.     Patient went to dentist appt 3/29 and had noticed elevated BP. Patient reports monitoring BP last few days, feeling nervous when seeing continually elevated BP ranging from 160-170s/80s-94.    Per protocol, patient needs to be seen within the next 3 days in office. Patient has previous appointment scheduled for 4/12/22 scheduled by previous RN - no triage encounter noted in chart.     Routing to PCP - Patient wants to be seen this week for continuously elevated BP. Okay to schedule in same-day, next day, or Virt-rel slot this week? Patient stated he can make any day/time work.    1. BLOOD PRESSURE: \"What is the blood pressure?\" \"Did you take at least two measurements 5 minutes apart?\"     9am /94 P64      9:30am  /101 P87  2. ONSET: \"When did you take your blood pressure?\"      9am, 9:30am today  3. HOW: \"How did you obtain the blood pressure?\" (e.g., visiting nurse, automatic home BP monitor)      Automatic home cuff  4. HISTORY: \"Do you have a history of high blood pressure?\"      Yes  5. MEDICATIONS: \"Are you taking any medications for blood pressure?\" \"Have you missed any doses recently?\"      Takes Amlodipine 40mg daily  6. OTHER SYMPTOMS: \"Do you have any symptoms?\" (e.g., headache, chest pain, blurred vision, difficulty breathing, weakness)      Patient denies any symptoms of elevated BP.     Can we leave a detailed message on this number? YES  Phone number patient can be reached at: Home number on file [phone] (home)    Claudia Delong RN  [facility] Triage        Reason for Disposition    Systolic BP >= 160 OR Diastolic >= 100    Additional Information    Negative: Sounds like a life-threatening emergency to the triager    Negative: Pregnant > 20 weeks or postpartum (< 6 weeks after delivery) and new hand or face swelling    Negative: Pregnant > 20 weeks and BP > " 140/90    Negative: Systolic BP >= 160 OR Diastolic >= 100, and any cardiac or neurologic symptoms (e.g., chest pain, difficulty breathing, unsteady gait, blurred vision)    Negative: Patient sounds very sick or weak to the triager    Negative: BP Systolic BP >= 140 OR Diastolic >= 90 and postpartum (from 0 to 6 weeks after delivery)    Negative: Systolic BP >= 180 OR Diastolic >= 110, and missed most recent dose of blood pressure medication    Negative: Systolic BP >= 180 OR Diastolic >= 110    Negative: Patient wants to be seen    Negative: Ran out of BP medications    Negative: Taking BP medications and feels is having side effects (e.g., impotence, cough, dizziness)    Protocols used: HIGH BLOOD PRESSURE-A-OH

## 2022-04-05 NOTE — TELEPHONE ENCOUNTER
Informed pt, he will increase to 5mg. Updated mar.  Pt will stay as scheduled.  Keely Silveira, RN  [facility] RN Triage Team

## 2022-04-05 NOTE — TELEPHONE ENCOUNTER
Increase Amlodipine to 5mg daily.  OK for clinic visit within 7 days.  Justin Broussard MD on 4/5/2022 at 9:49 AM

## 2022-04-12 ENCOUNTER — OFFICE VISIT (OUTPATIENT)
Dept: FAMILY MEDICINE | Facility: CLINIC | Age: 62
End: 2022-04-12
Payer: COMMERCIAL

## 2022-04-12 VITALS
BODY MASS INDEX: 32.09 KG/M2 | TEMPERATURE: 97.3 F | HEART RATE: 81 BPM | DIASTOLIC BLOOD PRESSURE: 86 MMHG | SYSTOLIC BLOOD PRESSURE: 141 MMHG | OXYGEN SATURATION: 99 % | RESPIRATION RATE: 12 BRPM | WEIGHT: 217.3 LBS

## 2022-04-12 DIAGNOSIS — I10 ESSENTIAL HYPERTENSION: Primary | ICD-10-CM

## 2022-04-12 LAB
BASOPHILS # BLD AUTO: 0 10E3/UL (ref 0–0.2)
BASOPHILS NFR BLD AUTO: 1 %
EOSINOPHIL # BLD AUTO: 0 10E3/UL (ref 0–0.7)
EOSINOPHIL NFR BLD AUTO: 1 %
ERYTHROCYTE [DISTWIDTH] IN BLOOD BY AUTOMATED COUNT: 14 % (ref 10–15)
HBA1C MFR BLD: 5.7 % (ref 0–5.6)
HCT VFR BLD AUTO: 44.3 % (ref 40–53)
HGB BLD-MCNC: 15.1 G/DL (ref 13.3–17.7)
LYMPHOCYTES # BLD AUTO: 1.4 10E3/UL (ref 0.8–5.3)
LYMPHOCYTES NFR BLD AUTO: 39 %
MCH RBC QN AUTO: 33.4 PG (ref 26.5–33)
MCHC RBC AUTO-ENTMCNC: 34.1 G/DL (ref 31.5–36.5)
MCV RBC AUTO: 98 FL (ref 78–100)
MONOCYTES # BLD AUTO: 0.4 10E3/UL (ref 0–1.3)
MONOCYTES NFR BLD AUTO: 11 %
NEUTROPHILS # BLD AUTO: 1.7 10E3/UL (ref 1.6–8.3)
NEUTROPHILS NFR BLD AUTO: 49 %
PLATELET # BLD AUTO: 315 10E3/UL (ref 150–450)
RBC # BLD AUTO: 4.52 10E6/UL (ref 4.4–5.9)
WBC # BLD AUTO: 3.5 10E3/UL (ref 4–11)

## 2022-04-12 PROCEDURE — 83036 HEMOGLOBIN GLYCOSYLATED A1C: CPT | Performed by: INTERNAL MEDICINE

## 2022-04-12 PROCEDURE — 85025 COMPLETE CBC W/AUTO DIFF WBC: CPT | Performed by: INTERNAL MEDICINE

## 2022-04-12 PROCEDURE — 99214 OFFICE O/P EST MOD 30 MIN: CPT | Performed by: INTERNAL MEDICINE

## 2022-04-12 PROCEDURE — 80048 BASIC METABOLIC PNL TOTAL CA: CPT | Performed by: INTERNAL MEDICINE

## 2022-04-12 PROCEDURE — 36415 COLL VENOUS BLD VENIPUNCTURE: CPT | Performed by: INTERNAL MEDICINE

## 2022-04-12 PROCEDURE — 84443 ASSAY THYROID STIM HORMONE: CPT | Performed by: INTERNAL MEDICINE

## 2022-04-12 RX ORDER — TRIAMTERENE/HYDROCHLOROTHIAZID 37.5-25 MG
1 TABLET ORAL DAILY
Qty: 30 TABLET | Refills: 1 | Status: SHIPPED | OUTPATIENT
Start: 2022-04-12 | End: 2022-05-11

## 2022-04-12 ASSESSMENT — PAIN SCALES - GENERAL: PAINLEVEL: MODERATE PAIN (5)

## 2022-04-12 NOTE — PATIENT INSTRUCTIONS
Aashish:  I believe that your blood pressure is related to essential hypertension.  Continue your current medications and we will add additional medication called Maxide.  Maxide will help reduce water retention.  Suspect that water retention is primary cause of your recent blood pressure elevation.    We should recheck your salt level and blood pressure level in 3 weeks.    Today I would like to evaluate your kidney function as well as your blood sugar level.    Best regards  Justin Broussard MD on 4/12/2022 at 3:35 PM     Call placed to patient  Call went right to voicemaill  Left message to call back and schedule CPE      Will decline medication as second attempt    Okay to schedule CPE with call back - please send new refill request once CPE is scheduled.

## 2022-04-12 NOTE — PROGRESS NOTES
"  Assessment & Plan     Essential hypertension  Continues to struggle with hypertension.  At this juncture I believe we need to increase his diuresis.  He appears to be quite sensitive to his sodium intake.    - Basic metabolic panel  (Ca, Cl, CO2, Creat, Gluc, K, Na, BUN); Future  - Hemoglobin A1c; Future  - CBC with platelets and differential; Future  - TSH with free T4 reflex; Future  - triamterene-HCTZ (MAXZIDE-25) 37.5-25 MG tablet; Take 1 tablet by mouth daily  - Basic metabolic panel  (Ca, Cl, CO2, Creat, Gluc, K, Na, BUN)  - Hemoglobin A1c  - CBC with platelets and differential  - TSH with free T4 reflex             BMI:   Estimated body mass index is 32.09 kg/m  as calculated from the following:    Height as of 6/21/21: 1.753 m (5' 9\").    Weight as of this encounter: 98.6 kg (217 lb 4.8 oz).       See Patient Instructions    Return in about 3 weeks (around 5/3/2022).    Justin Broussard MD  [facility] KWASI Zendejas is a 61 year old who presents for the following health issues patient with a history of hypertension.  He also has a history of bilateral osteoarthritis.  He does get occasional cortisone shots in his knees.    The patient has noticed some puffiness of the hands and feet at times.  He denies any endocrine hematologic or allergic symptoms otherwise no chest pain noted.  Does not always follow a low-sodium diet.    History of Present Illness       Hypertension: He presents for follow up of hypertension.  He does check blood pressure  regularly outside of the clinic. Outside blood pressures have been over 140/90. He does not follow a low salt diet.     He eats 2-3 servings of fruits and vegetables daily.He consumes 0 sweetened beverage(s) daily.He exercises with enough effort to increase his heart rate 20 to 29 minutes per day.  He exercises with enough effort to increase his heart rate 4 days per week. He is missing 1 dose(s) of medications per week.  He is not taking " prescribed medications regularly due to remembering to take.             Review of Systems   Constitutional, HEENT, cardiovascular, pulmonary, gi and gu systems are negative, except as otherwise noted.      Objective    BP (!) 141/86 (BP Location: Right arm, Patient Position: Sitting, Cuff Size: Adult Regular)   Pulse 81   Temp 97.3  F (36.3  C) (Temporal)   Resp 12   Wt 98.6 kg (217 lb 4.8 oz)   SpO2 99%   BMI 32.09 kg/m    Body mass index is 32.09 kg/m .  Physical Exam   GENERAL: healthy, alert and no distress  EYES: Eyes grossly normal to inspection, PERRL and conjunctivae and sclerae normal  HENT: ear canals and TM's normal, nose and mouth without ulcers or lesions  NECK: no adenopathy, no asymmetry, masses, or scars and thyroid normal to palpation  RESP: lungs clear to auscultation - no rales, rhonchi or wheezes  CV: regular rate and rhythm, normal S1 S2, no S3 or S4, no murmur, click or rub, no peripheral edema and peripheral pulses strong  ABDOMEN: soft, nontender, no hepatosplenomegaly, no masses and bowel sounds normal  MS: no gross musculoskeletal defects noted, no edema  SKIN: no suspicious lesions or rashes  NEURO: Normal strength and tone, mentation intact and speech normal  PSYCH: mentation appears normal, affect normal/bright    Orders Only on 06/07/2021   Component Date Value Ref Range Status     PSA 06/07/2021 2.17  0 - 4 ug/L Final    Assay Method:  Chemiluminescence using Siemens Vista analyzer     TSH 06/07/2021 1.09  0.40 - 4.00 mU/L Final     WBC 06/07/2021 4.5  4.0 - 11.0 10e9/L Final     RBC Count 06/07/2021 4.44  4.4 - 5.9 10e12/L Final     Hemoglobin 06/07/2021 14.9  13.3 - 17.7 g/dL Final     Hematocrit 06/07/2021 42.3  40.0 - 53.0 % Final     MCV 06/07/2021 95  78 - 100 fl Final     MCH 06/07/2021 33.6 (A) 26.5 - 33.0 pg Final     MCHC 06/07/2021 35.2  31.5 - 36.5 g/dL Final     RDW 06/07/2021 13.8  10.0 - 15.0 % Final     Platelet Count 06/07/2021 262  150 - 450 10e9/L Final     %  Neutrophils 06/07/2021 61.9  % Final     % Lymphocytes 06/07/2021 28.3  % Final     % Monocytes 06/07/2021 9.4  % Final     % Eosinophils 06/07/2021 0.2  % Final     % Basophils 06/07/2021 0.2  % Final     Absolute Neutrophil 06/07/2021 2.8  1.6 - 8.3 10e9/L Final     Absolute Lymphocytes 06/07/2021 1.3  0.8 - 5.3 10e9/L Final     Absolute Monocytes 06/07/2021 0.4  0.0 - 1.3 10e9/L Final     Absolute Eosinophils 06/07/2021 0.0  0.0 - 0.7 10e9/L Final     Absolute Basophils 06/07/2021 0.0  0.0 - 0.2 10e9/L Final     Diff Method 06/07/2021 Automated Method   Final     Sodium 06/07/2021 138  133 - 144 mmol/L Final     Potassium 06/07/2021 4.6  3.4 - 5.3 mmol/L Final     Chloride 06/07/2021 106  94 - 109 mmol/L Final     Carbon Dioxide 06/07/2021 27  20 - 32 mmol/L Final     Anion Gap 06/07/2021 5  3 - 14 mmol/L Final     Glucose 06/07/2021 105 (A) 70 - 99 mg/dL Final    Fasting specimen     Urea Nitrogen 06/07/2021 16  7 - 30 mg/dL Final     Creatinine 06/07/2021 1.02  0.66 - 1.25 mg/dL Final     GFR Estimate 06/07/2021 78  >60 mL/min/[1.73_m2] Final    Comment: Non  GFR Calc  Starting 12/18/2018, serum creatinine based estimated GFR (eGFR) will be   calculated using the Chronic Kidney Disease Epidemiology Collaboration   (CKD-EPI) equation.       GFR Estimate If Black 06/07/2021 >90  >60 mL/min/[1.73_m2] Final    Comment:  GFR Calc  Starting 12/18/2018, serum creatinine based estimated GFR (eGFR) will be   calculated using the Chronic Kidney Disease Epidemiology Collaboration   (CKD-EPI) equation.       Calcium 06/07/2021 9.3  8.5 - 10.1 mg/dL Final     Bilirubin Total 06/07/2021 0.7  0.2 - 1.3 mg/dL Final     Albumin 06/07/2021 4.1  3.4 - 5.0 g/dL Final     Protein Total 06/07/2021 7.5  6.8 - 8.8 g/dL Final     Alkaline Phosphatase 06/07/2021 102  40 - 150 U/L Final     ALT 06/07/2021 44  0 - 70 U/L Final     AST 06/07/2021 31  0 - 45 U/L Final     Cholesterol 06/07/2021 243 (A) <200  mg/dL Final    Desirable:       <200 mg/dl     Triglycerides 06/07/2021 51  <150 mg/dL Final    Fasting specimen     HDL Cholesterol 06/07/2021 101  >39 mg/dL Final     LDL Cholesterol Calculated 06/07/2021 132 (A) <100 mg/dL Final    Comment: Above desirable:  100-129 mg/dl  Borderline High:  130-159 mg/dL  High:             160-189 mg/dL  Very high:       >189 mg/dl       Non HDL Cholesterol 06/07/2021 142 (A) <130 mg/dL Final    Comment: Above Desirable:  130-159 mg/dl  Borderline high:  160-189 mg/dl  High:             190-219 mg/dl  Very high:       >219 mg/dl       Hepatitis C Antibody 06/07/2021 Nonreactive  NR^Nonreactive Final    Comment: Assay performance characteristics have not been established for newborns,   infants, and children       HIV Antigen Antibody Combo 06/07/2021 Nonreactive  NR^Nonreactive     Final    HIV-1 p24 Ag & HIV-1/HIV-2 Ab Not Detected

## 2022-04-13 LAB
ANION GAP SERPL CALCULATED.3IONS-SCNC: 6 MMOL/L (ref 3–14)
BUN SERPL-MCNC: 14 MG/DL (ref 7–30)
CALCIUM SERPL-MCNC: 9.3 MG/DL (ref 8.5–10.1)
CHLORIDE BLD-SCNC: 103 MMOL/L (ref 94–109)
CO2 SERPL-SCNC: 28 MMOL/L (ref 20–32)
CREAT SERPL-MCNC: 0.99 MG/DL (ref 0.66–1.25)
GFR SERPL CREATININE-BSD FRML MDRD: 86 ML/MIN/1.73M2
GLUCOSE BLD-MCNC: 90 MG/DL (ref 70–99)
POTASSIUM BLD-SCNC: 4.1 MMOL/L (ref 3.4–5.3)
SODIUM SERPL-SCNC: 137 MMOL/L (ref 133–144)
TSH SERPL DL<=0.005 MIU/L-ACNC: 1.5 MU/L (ref 0.4–4)

## 2022-05-09 DIAGNOSIS — I10 ESSENTIAL HYPERTENSION: ICD-10-CM

## 2022-05-11 RX ORDER — TRIAMTERENE/HYDROCHLOROTHIAZID 37.5-25 MG
1 TABLET ORAL DAILY
Qty: 90 TABLET | Refills: 1 | Status: SHIPPED | OUTPATIENT
Start: 2022-05-11 | End: 2022-06-10

## 2022-05-11 NOTE — TELEPHONE ENCOUNTER
Routing refill request to provider for review/approval because:  Labs out of range:    BP Readings from Last 3 Encounters:   04/12/22 (!) 141/86   06/21/21 (!) 146/87   06/01/21 132/82         Luis Pagan RN  [facility]

## 2022-05-16 ENCOUNTER — MYC REFILL (OUTPATIENT)
Dept: FAMILY MEDICINE | Facility: CLINIC | Age: 62
End: 2022-05-16
Payer: COMMERCIAL

## 2022-05-16 DIAGNOSIS — I10 HYPERTENSION, UNSPECIFIED TYPE: ICD-10-CM

## 2022-05-18 RX ORDER — AMLODIPINE BESYLATE 2.5 MG/1
5 TABLET ORAL DAILY
Qty: 60 TABLET | Refills: 0 | Status: SHIPPED | OUTPATIENT
Start: 2022-05-18 | End: 2022-08-31

## 2022-05-18 NOTE — TELEPHONE ENCOUNTER
Routing refill request to provider for review/approval because:  BP Readings from Last 3 Encounters:   04/12/22 (!) 141/86   06/21/21 (!) 146/87   06/01/21 132/82     Appointment scheduled 6/9    Dmitry Ann, RN  [facility] Triage Nurse

## 2022-06-10 ENCOUNTER — VIRTUAL VISIT (OUTPATIENT)
Dept: FAMILY MEDICINE | Facility: CLINIC | Age: 62
End: 2022-06-10
Payer: COMMERCIAL

## 2022-06-10 VITALS
DIASTOLIC BLOOD PRESSURE: 78 MMHG | BODY MASS INDEX: 29.98 KG/M2 | WEIGHT: 203 LBS | HEART RATE: 69 BPM | SYSTOLIC BLOOD PRESSURE: 133 MMHG

## 2022-06-10 DIAGNOSIS — I10 BENIGN ESSENTIAL HYPERTENSION: Primary | ICD-10-CM

## 2022-06-10 PROCEDURE — 99213 OFFICE O/P EST LOW 20 MIN: CPT | Mod: 95 | Performed by: INTERNAL MEDICINE

## 2022-06-10 RX ORDER — LISINOPRIL AND HYDROCHLOROTHIAZIDE 12.5; 2 MG/1; MG/1
1 TABLET ORAL DAILY
Qty: 90 TABLET | Refills: 1 | Status: SHIPPED | OUTPATIENT
Start: 2022-06-10 | End: 2022-08-31

## 2022-06-10 NOTE — PROGRESS NOTES
Aashish is a 61 year old who is being evaluated via a billable video visit.      How would you like to obtain your AVS? MyChart  If the video visit is dropped, the invitation should be resent by: Text to cell phone: [phone]  Will anyone else be joining your video visit? No    Video Start Time: 4 PM PM    Assessment & Plan     Benign essential hypertension  With a past patient did better on lisinopril with hydrochlorothiazide.  We will continue this medication and have the patient follow-up with blood pressure    - Lipid panel reflex to direct LDL Fasting; Future  - lisinopril-hydrochlorothiazide (ZESTORETIC) 20-12.5 MG tablet; Take 1 tablet by mouth daily       See Patient Instructions    No follow-ups on file.    Justin Broussard MD  [facility]    José Miguel Zendejas is a 61 year old who presents for the following health issues     History of Present Illness       Hypertension: He presents for follow up of hypertension.  He does check blood pressure  regularly outside of the clinic. Outside blood pressures have been over 140/90. He follows a low salt diet.         Medication Followup     Taking Medication as prescribed: yes    Side Effects:   yes          Review of Systems   Patient with noted.  Feeling poorly on current medication      Objective           Vitals:  No vitals were obtained today due to virtual visit.    Physical Exam   GENERAL: Healthy, alert and no distress  EYES: Eyes grossly normal to inspection.  No discharge or erythema, or obvious scleral/conjunctival abnormalities.  RESP: No audible wheeze, cough, or visible cyanosis.  No visible retractions or increased work of breathing.    SKIN: Visible skin clear. No significant rash, abnormal pigmentation or lesions.  NEURO: Cranial nerves grossly intact.  Mentation and speech appropriate for age.  PSYCH: Mentation appears normal, affect normal/bright, judgement and insight intact, normal speech and appearance well-groomed.    Office  Visit on 04/12/2022   Component Date Value Ref Range Status     Sodium 04/12/2022 137  133 - 144 mmol/L Final     Potassium 04/12/2022 4.1  3.4 - 5.3 mmol/L Final     Chloride 04/12/2022 103  94 - 109 mmol/L Final     Carbon Dioxide (CO2) 04/12/2022 28  20 - 32 mmol/L Final     Anion Gap 04/12/2022 6  3 - 14 mmol/L Final     Urea Nitrogen 04/12/2022 14  7 - 30 mg/dL Final     Creatinine 04/12/2022 0.99  0.66 - 1.25 mg/dL Final     Calcium 04/12/2022 9.3  8.5 - 10.1 mg/dL Final     Glucose 04/12/2022 90  70 - 99 mg/dL Final     GFR Estimate 04/12/2022 86  >60 mL/min/1.73m2 Final    Effective December 21, 2021 eGFRcr in adults is calculated using the 2021 CKD-EPI creatinine equation which includes age and gender (Heidy et al., [facility], DOI: 10.1056/FUWDax9866988)     Hemoglobin A1C 04/12/2022 5.7 (A) 0.0 - 5.6 % Final    Normal <5.7%   Prediabetes 5.7-6.4%    Diabetes 6.5% or higher     Note: Adopted from ADA consensus guidelines.     TSH 04/12/2022 1.50  0.40 - 4.00 mU/L Final     WBC Count 04/12/2022 3.5 (A) 4.0 - 11.0 10e3/uL Final     RBC Count 04/12/2022 4.52  4.40 - 5.90 10e6/uL Final     Hemoglobin 04/12/2022 15.1  13.3 - 17.7 g/dL Final     Hematocrit 04/12/2022 44.3  40.0 - 53.0 % Final     MCV 04/12/2022 98  78 - 100 fL Final     MCH 04/12/2022 33.4 (A) 26.5 - 33.0 pg Final     MCHC 04/12/2022 34.1  31.5 - 36.5 g/dL Final     RDW 04/12/2022 14.0  10.0 - 15.0 % Final     Platelet Count 04/12/2022 315  150 - 450 10e3/uL Final     % Neutrophils 04/12/2022 49  % Final     % Lymphocytes 04/12/2022 39  % Final     % Monocytes 04/12/2022 11  % Final     % Eosinophils 04/12/2022 1  % Final     % Basophils 04/12/2022 1  % Final     Absolute Neutrophils 04/12/2022 1.7  1.6 - 8.3 10e3/uL Final     Absolute Lymphocytes 04/12/2022 1.4  0.8 - 5.3 10e3/uL Final     Absolute Monocytes 04/12/2022 0.4  0.0 - 1.3 10e3/uL Final     Absolute Eosinophils 04/12/2022 0.0  0.0 - 0.7 10e3/uL Final     Absolute Basophils 04/12/2022 0.0   0.0 - 0.2 10e3/uL Final               Video-Visit Details    Type of service:  Video Visit    Video End Time: 4:15 PM    Originating Location (pt. Location): Home    Distant Location (provider location):  [facility]     Platform used for Video Visit: Rosa

## 2022-06-10 NOTE — NURSING NOTE
Called patient and left message to return call to clinic to schedule fasting labs.    Payton Andrea MA

## 2022-06-17 ENCOUNTER — LAB (OUTPATIENT)
Dept: LAB | Facility: CLINIC | Age: 62
End: 2022-06-17
Payer: COMMERCIAL

## 2022-06-17 DIAGNOSIS — I10 BENIGN ESSENTIAL HYPERTENSION: ICD-10-CM

## 2022-06-17 LAB
CHOLEST SERPL-MCNC: 326 MG/DL
FASTING STATUS PATIENT QL REPORTED: YES
HDLC SERPL-MCNC: 131 MG/DL
LDLC SERPL CALC-MCNC: 186 MG/DL
NONHDLC SERPL-MCNC: 195 MG/DL
TRIGL SERPL-MCNC: 43 MG/DL

## 2022-06-17 PROCEDURE — 36415 COLL VENOUS BLD VENIPUNCTURE: CPT

## 2022-06-17 PROCEDURE — 80061 LIPID PANEL: CPT

## 2022-07-31 ENCOUNTER — HEALTH MAINTENANCE LETTER (OUTPATIENT)
Age: 62
End: 2022-07-31

## 2022-08-29 ASSESSMENT — ENCOUNTER SYMPTOMS
HEADACHES: 0
HEMATURIA: 0
CONSTIPATION: 0
PALPITATIONS: 0
SHORTNESS OF BREATH: 0
CHILLS: 0
DYSURIA: 0
DIARRHEA: 0
FEVER: 0
HEMATOCHEZIA: 0
SORE THROAT: 0
NERVOUS/ANXIOUS: 0
ABDOMINAL PAIN: 0
EYE PAIN: 0
FREQUENCY: 1
DIZZINESS: 0
WEAKNESS: 0
MYALGIAS: 0
ARTHRALGIAS: 0
NAUSEA: 0
PARESTHESIAS: 0
COUGH: 0
JOINT SWELLING: 0
HEARTBURN: 0

## 2022-08-31 ENCOUNTER — OFFICE VISIT (OUTPATIENT)
Dept: FAMILY MEDICINE | Facility: CLINIC | Age: 62
End: 2022-08-31
Payer: COMMERCIAL

## 2022-08-31 VITALS
RESPIRATION RATE: 16 BRPM | BODY MASS INDEX: 30.59 KG/M2 | SYSTOLIC BLOOD PRESSURE: 128 MMHG | WEIGHT: 206.5 LBS | HEIGHT: 69 IN | HEART RATE: 87 BPM | DIASTOLIC BLOOD PRESSURE: 80 MMHG | OXYGEN SATURATION: 99 % | TEMPERATURE: 99.5 F

## 2022-08-31 DIAGNOSIS — Z00.00 ENCOUNTER FOR PREVENTIVE HEALTH EXAMINATION: ICD-10-CM

## 2022-08-31 DIAGNOSIS — I10 BENIGN ESSENTIAL HYPERTENSION: ICD-10-CM

## 2022-08-31 DIAGNOSIS — Z12.11 SCREEN FOR COLON CANCER: Primary | ICD-10-CM

## 2022-08-31 DIAGNOSIS — G47.00 INSOMNIA, UNSPECIFIED TYPE: ICD-10-CM

## 2022-08-31 LAB
BASOPHILS # BLD AUTO: 0 10E3/UL (ref 0–0.2)
BASOPHILS NFR BLD AUTO: 1 %
EOSINOPHIL # BLD AUTO: 0 10E3/UL (ref 0–0.7)
EOSINOPHIL NFR BLD AUTO: 0 %
ERYTHROCYTE [DISTWIDTH] IN BLOOD BY AUTOMATED COUNT: 12.7 % (ref 10–15)
HBA1C MFR BLD: 5.4 % (ref 0–5.6)
HCT VFR BLD AUTO: 44.2 % (ref 40–53)
HGB BLD-MCNC: 15.1 G/DL (ref 13.3–17.7)
IMM GRANULOCYTES # BLD: 0 10E3/UL
IMM GRANULOCYTES NFR BLD: 0 %
LYMPHOCYTES # BLD AUTO: 1.5 10E3/UL (ref 0.8–5.3)
LYMPHOCYTES NFR BLD AUTO: 38 %
MCH RBC QN AUTO: 33.6 PG (ref 26.5–33)
MCHC RBC AUTO-ENTMCNC: 34.2 G/DL (ref 31.5–36.5)
MCV RBC AUTO: 98 FL (ref 78–100)
MONOCYTES # BLD AUTO: 0.3 10E3/UL (ref 0–1.3)
MONOCYTES NFR BLD AUTO: 9 %
NEUTROPHILS # BLD AUTO: 2.1 10E3/UL (ref 1.6–8.3)
NEUTROPHILS NFR BLD AUTO: 53 %
PLATELET # BLD AUTO: 270 10E3/UL (ref 150–450)
RBC # BLD AUTO: 4.49 10E6/UL (ref 4.4–5.9)
WBC # BLD AUTO: 3.9 10E3/UL (ref 4–11)

## 2022-08-31 PROCEDURE — 80061 LIPID PANEL: CPT | Performed by: INTERNAL MEDICINE

## 2022-08-31 PROCEDURE — 36415 COLL VENOUS BLD VENIPUNCTURE: CPT | Performed by: INTERNAL MEDICINE

## 2022-08-31 PROCEDURE — 99396 PREV VISIT EST AGE 40-64: CPT | Performed by: INTERNAL MEDICINE

## 2022-08-31 PROCEDURE — 83036 HEMOGLOBIN GLYCOSYLATED A1C: CPT | Performed by: INTERNAL MEDICINE

## 2022-08-31 PROCEDURE — 80050 GENERAL HEALTH PANEL: CPT | Performed by: INTERNAL MEDICINE

## 2022-08-31 PROCEDURE — G0103 PSA SCREENING: HCPCS | Performed by: INTERNAL MEDICINE

## 2022-08-31 RX ORDER — LISINOPRIL AND HYDROCHLOROTHIAZIDE 12.5; 2 MG/1; MG/1
1 TABLET ORAL DAILY
Qty: 90 TABLET | Refills: 1 | Status: ON HOLD | OUTPATIENT
Start: 2022-08-31 | End: 2022-12-13

## 2022-08-31 RX ORDER — TRAZODONE HYDROCHLORIDE 50 MG/1
TABLET, FILM COATED ORAL
Qty: 30 TABLET | Refills: 1 | Status: SHIPPED | OUTPATIENT
Start: 2022-08-31 | End: 2023-01-16

## 2022-08-31 ASSESSMENT — PAIN SCALES - GENERAL: PAINLEVEL: NO PAIN (0)

## 2022-08-31 ASSESSMENT — ENCOUNTER SYMPTOMS: FREQUENCY: 1

## 2022-08-31 NOTE — PATIENT INSTRUCTIONS
Aashish;  I think you're doing great.    Continue your diet and exercise.    We will check your cholesterol and other labs today.    Regards    Justin Broussard MD on 8/31/2022 at 1:02 PM

## 2022-08-31 NOTE — PROGRESS NOTES
SUBJECTIVE:   CC: Aashish Aguilar is an 62 year old male who presents for preventative health visit.  Patient has been doing very well.  Been exercising and losing weight.    He denies any chest pain or shortness of breath no endocrine hematologic or allergic symptoms noted.  Blood pressure is very well controlled on Zestoretic.      Patient has been advised of split billing requirements and indicates understanding: Yes  Healthy Habits:     Getting at least 3 servings of Calcium per day:  Yes    Bi-annual eye exam:  NO    Dental care twice a year:  Yes    Diet:  Low salt, Low fat/cholesterol and Carbohydrate counting    Duration of exercise:  30-45 minutes    Taking medications regularly:  No    Barriers to taking medications:  Side effects    Medication side effects:  Other    PHQ-2 Total Score: 0    Additional concerns today:  Yes              Today's PHQ-2 Score:   PHQ-2 (  [employer]) 2022   Q1: Little interest or pleasure in doing things 0   Q2: Feeling down, depressed or hopeless 0   PHQ-2 Score 0   PHQ-2 Total Score (12-17 Years)- Positive if 3 or more points; Administer PHQ-A if positive -   Q1: Little interest or pleasure in doing things Not at all   Q2: Feeling down, depressed or hopeless Not at all   PHQ-2 Score 0       Abuse: Current or Past(Physical, Sexual or Emotional)- No  Do you feel safe in your environment? Yes        Social History     Tobacco Use     Smoking status: Former Smoker     Quit date: 2000     Years since quittin.3     Smokeless tobacco: Never Used   Substance Use Topics     Alcohol use: Yes     Comment: 1drink a day     If you drink alcohol do you typically have >3 drinks per day or >7 drinks per week? No    Alcohol Use 2022   Prescreen: >3 drinks/day or >7 drinks/week? No   Prescreen: >3 drinks/day or >7 drinks/week? -       Last PSA:   PSA   Date Value Ref Range Status   2021 2.17 0 - 4 ug/L Final     Comment:     Assay Method:  Chemiluminescence using Siemens  Vista analyzer       Reviewed orders with patient. Reviewed health maintenance and updated orders accordingly - Yes  Lab work is in process    Reviewed and updated as needed this visit by clinical staff                    Reviewed and updated as needed this visit by Provider                   Past Medical History:   Diagnosis Date     History of colonic polyps      Hypertension         Review of Systems   Genitourinary: Positive for frequency.     CONSTITUTIONAL: NEGATIVE for fever, chills, change in weight  INTEGUMENTARY/SKIN: NEGATIVE for worrisome rashes, moles or lesions  EYES: NEGATIVE for vision changes or irritation  ENT: NEGATIVE for ear, mouth and throat problems  RESP: NEGATIVE for significant cough or SOB  CV: NEGATIVE for chest pain, palpitations or peripheral edema  GI: NEGATIVE for nausea, abdominal pain, heartburn, or change in bowel habits   male: negative for dysuria, hematuria, decreased urinary stream, erectile dysfunction, urethral discharge  MUSCULOSKELETAL: NEGATIVE for significant arthralgias or myalgia  NEURO: NEGATIVE for weakness, dizziness or paresthesias  PSYCHIATRIC: NEGATIVE for changes in mood or affect    OBJECTIVE:   There were no vitals taken for this visit.    Physical Exam  GENERAL: healthy, alert and no distress  EYES: Eyes grossly normal to inspection, PERRL and conjunctivae and sclerae normal  HENT: ear canals and TM's normal, nose and mouth without ulcers or lesions  NECK: no adenopathy, no asymmetry, masses, or scars and thyroid normal to palpation  RESP: lungs clear to auscultation - no rales, rhonchi or wheezes  CV: regular rate and rhythm, normal S1 S2, no S3 or S4, no murmur, click or rub, no peripheral edema and peripheral pulses strong  ABDOMEN: soft, nontender, no hepatosplenomegaly, no masses and bowel sounds normal  MS: no gross musculoskeletal defects noted, no edema  SKIN: no suspicious lesions or rashes  NEURO: Normal strength and tone, mentation intact and  "speech normal  PSYCH: mentation appears normal, affect normal/bright    Diagnostic Test Results:  Labs reviewed in Epic    ASSESSMENT/PLAN:       ICD-10-CM    1. Screen for colon cancer  Z12.11 Colonoscopy Screening  Referral   2. Benign essential hypertension  I10 lisinopril-hydrochlorothiazide (ZESTORETIC) 20-12.5 MG tablet   3. Insomnia, unspecified type  G47.00 traZODone (DESYREL) 50 MG tablet   4. Encounter for preventive health examination  Z00.00 Comprehensive metabolic panel (BMP + Alb, Alk Phos, ALT, AST, Total. Bili, TP)     CBC with platelets and differential     TSH with free T4 reflex     Lipid panel reflex to direct LDL Fasting     PSA, screen     Hemoglobin A1c     Comprehensive metabolic panel (BMP + Alb, Alk Phos, ALT, AST, Total. Bili, TP)     CBC with platelets and differential     TSH with free T4 reflex     Lipid panel reflex to direct LDL Fasting     PSA, screen     Hemoglobin A1c           COUNSELING:   Reviewed preventive health counseling, as reflected in patient instructions    Estimated body mass index is 29.98 kg/m  as calculated from the following:    Height as of 6/21/21: 1.753 m (5' 9\").    Weight as of 6/10/22: 92.1 kg (203 lb).         He reports that he quit smoking about 22 years ago. He has never used smokeless tobacco.      Counseling Resources:  ATP IV Guidelines  Pooled Cohorts Equation Calculator  FRAX Risk Assessment  ICSI Preventive Guidelines  Dietary Guidelines for Americans, 2010  USDA's MyPlate  ASA Prophylaxis  Lung CA Screening    Justin Broussard MD  [facility]  "

## 2022-09-01 ENCOUNTER — TELEPHONE (OUTPATIENT)
Dept: GASTROENTEROLOGY | Facility: CLINIC | Age: 62
End: 2022-09-01

## 2022-09-01 LAB
ALBUMIN SERPL-MCNC: 4.4 G/DL (ref 3.4–5)
ALP SERPL-CCNC: 73 U/L (ref 40–150)
ALT SERPL W P-5'-P-CCNC: 38 U/L (ref 0–70)
ANION GAP SERPL CALCULATED.3IONS-SCNC: 7 MMOL/L (ref 3–14)
AST SERPL W P-5'-P-CCNC: 21 U/L (ref 0–45)
BILIRUB SERPL-MCNC: 0.6 MG/DL (ref 0.2–1.3)
BUN SERPL-MCNC: 15 MG/DL (ref 7–30)
CALCIUM SERPL-MCNC: 9.4 MG/DL (ref 8.5–10.1)
CHLORIDE BLD-SCNC: 99 MMOL/L (ref 94–109)
CHOLEST SERPL-MCNC: 341 MG/DL
CO2 SERPL-SCNC: 28 MMOL/L (ref 20–32)
CREAT SERPL-MCNC: 1.03 MG/DL (ref 0.66–1.25)
FASTING STATUS PATIENT QL REPORTED: YES
GFR SERPL CREATININE-BSD FRML MDRD: 82 ML/MIN/1.73M2
GLUCOSE BLD-MCNC: 111 MG/DL (ref 70–99)
HDLC SERPL-MCNC: 106 MG/DL
LDLC SERPL CALC-MCNC: 220 MG/DL
NONHDLC SERPL-MCNC: 235 MG/DL
POTASSIUM BLD-SCNC: 3.9 MMOL/L (ref 3.4–5.3)
PROT SERPL-MCNC: 7.5 G/DL (ref 6.8–8.8)
PSA SERPL-MCNC: 2.66 UG/L (ref 0–4)
SODIUM SERPL-SCNC: 134 MMOL/L (ref 133–144)
TRIGL SERPL-MCNC: 76 MG/DL
TSH SERPL DL<=0.005 MIU/L-ACNC: 1.88 MU/L (ref 0.4–4)

## 2022-09-01 NOTE — TELEPHONE ENCOUNTER
Screening Questions    BlueKIND OF PREP RedLOCATION [review exclusion criteria] GreenSEDATION TYPE      1. Are you active on mychart? Y    2. What insurance is in the chart? [employer]     3.   Ordering/Referring Provider: Justin Broussard MD in  FAMILY PRAC/IM    4. BMI   (If greater than 40 review exclusion criteria [PAC APPT IF [MAC] @ [facility])  30.4  [If yes, BMI OVER 40-EXTENDED PREP]      **(Sedation review/consideration needed)**  Do you have a legal guardian or Medical Power of    and/or are you able to give consent for your medical care?     SELF    5. Have you had a positive covid test in the last 90 days?   N -     6.  Are you currently on dialysis?   N [ If yes, G-PREP & HOSPITAL setting ONLY]     7.  Do you have chronic kidney disease?  N [ If yes, G-PREP ]    8.   Do you have a diagnosis of diabetes?   N   [ If yes, G-PREP ]    9.  On a regular basis do you go 3-5 days between bowel movements?   N   [ If yes, EXTENDED PREP]    10.  Are you taking any prescription pain medications on a routine schedule?    N -  [ If yes, EXTENDED PREP] [If yes, MAC]      11.   Do you have any chemical dependencies such as alcohol, street drugs, or methadone?    N [If yes, MAC]    12.   Do you have any history of post-traumatic stress syndrome, severe anxiety or history of psychosis?    N  [If yes, MAC]    13.  [FEMALES] Are you currently pregnant? N    If yes, how many weeks?       Respiratory/Heart Screening:  [If yes to any of the following HOSPITAL setting only]     14. Do you have Pulmonary Hypertension [Lungs]?   N       15. Do you have UNCONTROLLED asthma?   N     16.  Do you use daily home oxygen?  N      17. Do you have mod to severe Obstructive Sleep Apnea?         (OKAY @  U  SH  PH  RI  MG - if pt is not on OXYGEN)  N      18.   Have you had a heart or lung transplant?   N      19.   Have you had a stroke or Transient ischemic attack (TIA - aka  mini stroke ) within 6 months?  (If yes, please review  exclusion criteria)  N     20.   In the past 6 months, have you had any heart related issues including cardiomyopathy or heart attack?              If yes, did it require cardiac stenting or other implantable device?         21.   Do you have any implantable devices in your body (pacemaker, defib, LVAD)? (If yes, please review exclusion criteria)  N   22.  Do you take the medication Phentermine?  NO        23. Do you take nitroglycerin?   N           If yes, how often?   (if yes, HOSPITAL setting ONLY)    24.  Are you currently taking any blood thinners?    [If yes, INFORM patient to [facility] w/ ORDERING PROVIDER FOR BRIDGING INSTRUCTIONS]     N    25.   Do you transfer independently?                (If NO, please HOSPITAL setting ONLY)  Y    26.   Preferred LOCAL Pharmacy for Pre Prescription:           [id number] - [place], [place] - [address] AT [facility] [address]    Scheduling Details  (Please ask for phone number if not scheduled by patient)      Caller : Aashish Aguilar  Date of Procedure: 9/8/22  Surgeon: Honey  Location:         Sedation Type: CS l   Conscious Sedation- Needs  for 6 hours after the procedure  MAC/General-Needs  for 24 hours after procedure    N :[Pre-op Required] at [facility]  MG and OR for MAC sedation   (advise patient they will need a pre-op WITH IN 30 DAYS of procedure date)     Type of Procedure Scheduled:   Lower Endoscopy [Colonoscopy]    Which Colonoscopy Prep was Sent?:   Golytely -     [facility] CF PATIENTS & [facility]S PATIENTS NEEDS EXTENDED PREP       Informed patient they will need an adult  Y  Cannot take any type of public or medical transportation alone    Pre-Procedure Covid test to be completed at [facility] or Externally: home  **INFORMED OF HOME TESTING & LAB OPTION**        Confirmed Nurse will call to complete assessment Y    Additional comments:

## 2022-09-06 ENCOUNTER — MYC MEDICAL ADVICE (OUTPATIENT)
Dept: FAMILY MEDICINE | Facility: CLINIC | Age: 62
End: 2022-09-06

## 2022-09-06 DIAGNOSIS — E78.5 HYPERLIPIDEMIA LDL GOAL <130: ICD-10-CM

## 2022-09-07 RX ORDER — LIDOCAINE 40 MG/G
CREAM TOPICAL
Status: CANCELLED | OUTPATIENT
Start: 2022-09-07

## 2022-09-07 RX ORDER — ONDANSETRON 2 MG/ML
4 INJECTION INTRAMUSCULAR; INTRAVENOUS
Status: CANCELLED | OUTPATIENT
Start: 2022-09-07

## 2022-09-08 ENCOUNTER — HOSPITAL ENCOUNTER (OUTPATIENT)
Facility: CLINIC | Age: 62
Discharge: HOME | End: 2022-09-08
Attending: COLON & RECTAL SURGERY | Admitting: COLON & RECTAL SURGERY
Payer: COMMERCIAL

## 2022-09-08 VITALS
WEIGHT: 205 LBS | SYSTOLIC BLOOD PRESSURE: 124 MMHG | DIASTOLIC BLOOD PRESSURE: 78 MMHG | OXYGEN SATURATION: 99 % | RESPIRATION RATE: 20 BRPM | BODY MASS INDEX: 30.36 KG/M2 | HEIGHT: 69 IN | HEART RATE: 63 BPM

## 2022-09-08 DIAGNOSIS — Z12.11 ENCOUNTER FOR SCREENING COLONOSCOPY: Primary | ICD-10-CM

## 2022-09-08 LAB — COLONOSCOPY: NORMAL

## 2022-09-08 PROCEDURE — 45385 COLONOSCOPY W/LESION REMOVAL: CPT | Performed by: COLON & RECTAL SURGERY

## 2022-09-08 PROCEDURE — 250N000011 HC RX IP 250 OP 636: Performed by: COLON & RECTAL SURGERY

## 2022-09-08 PROCEDURE — 88305 TISSUE EXAM BY PATHOLOGIST: CPT | Mod: TC | Performed by: COLON & RECTAL SURGERY

## 2022-09-08 PROCEDURE — G0500 MOD SEDAT ENDO SERVICE >5YRS: HCPCS | Performed by: COLON & RECTAL SURGERY

## 2022-09-08 PROCEDURE — 88305 TISSUE EXAM BY PATHOLOGIST: CPT | Mod: 26 | Performed by: PATHOLOGY

## 2022-09-08 RX ORDER — FENTANYL CITRATE 50 UG/ML
INJECTION, SOLUTION INTRAMUSCULAR; INTRAVENOUS PRN
Status: COMPLETED | OUTPATIENT
Start: 2022-09-08 | End: 2022-09-08

## 2022-09-08 RX ORDER — PRAVASTATIN SODIUM 40 MG
40 TABLET ORAL DAILY
Qty: 90 TABLET | Refills: 0 | Status: SHIPPED | OUTPATIENT
Start: 2022-09-08 | End: 2023-01-20

## 2022-09-08 RX ADMIN — MIDAZOLAM 2 MG: 1 INJECTION INTRAMUSCULAR; INTRAVENOUS at 09:27

## 2022-09-08 RX ADMIN — FENTANYL CITRATE 100 MCG: 50 INJECTION, SOLUTION INTRAMUSCULAR; INTRAVENOUS at 09:29

## 2022-09-08 ASSESSMENT — ACTIVITIES OF DAILY LIVING (ADL)
ADLS_ACUITY_SCORE: 35

## 2022-09-08 NOTE — TELEPHONE ENCOUNTER
PCP,   Please see patient's MyChart and reply back to patient or advise if triage follow up needed.    8/31/22 lab results  Everything looks better with the exception of your cholesterol levels.   It may be a reasonable option to restart the pravastatin if you can tolerate it     Previous Pravastatin dose and sig pended (not sure if same dose is planned for current RX)  Also pharmacy pended.    Future lipid panel pended---however can be deleted if not recommended x 6-8 weeks?        Thank you,  Merna MAYORGA RN,BSN

## 2022-09-08 NOTE — H&P
Colon & Rectal Surgery History and Physical  Pre-Endoscopy Procedure Note    History of Present Illness   I have been asked by Dr. Broussard to evaluate this 62 year old male for colorectal polyp surveillance. He currently denies any abdominal pain, weight loss, bleeding per rectum, or recent change in bowel habits.    Past Medical History  Diagnosis Date     History of colonic polyps      Hypertension        Past Surgical History  Procedure Laterality Date     COLONOSCOPY       ORTHOPEDIC PROCEDURE  01/01/1991    Left knee arthroscopy -- Abstracted 5/30/02     ORTHOPEDIC PROCEDURE  04/12/2002    Right knee arthroscopy -- Abstracted 5/30/02        Medications  Medication Sig     lisinopril-hydrochlorothiazide (ZESTORETIC) 20-12.5 MG tablet Take 1 tablet by mouth daily     traZODone (DESYREL) 50 MG tablet TAKE 1 TABLET(50 MG) BY MOUTH AT BEDTIME       Allergies  Allergies   Allergen Reactions     No Known Drug Allergies         Family History   family history includes Breast Cancer in his sister; Cancer in his brother; Diabetes in his sister and sister; Hypertension in his brother, sister, and sister.     Social History   He reports that he quit smoking about 22 years ago. He has never used smokeless tobacco. He reports current alcohol use. He reports that he does not use drugs.    Review of Systems   Constitutional:  No fever, weight change or fatigue.    Eyes:     No dry eyes or vision changes.   Ears/Nose/Throat/Neck:  No oral ulcers, sore throat or voice change.    Cardiovascular:   No palpitations, syncope, angina or edema.   Respiratory:    No chest pain, excessive sleepiness, shortness of breath or hemoptysis.    Gastrointestinal:   No abdominal pain, nausea, vomiting, diarrhea or heartburn.    Genitourinary:   No dysuria, hematuria, urinary retention or urinary frequency.   Musculoskeletal:  No joint swelling or arthralgias.    Dermatologic:  No skin rash or other skin changes.   Neurologic:    No focal  "weakness or numbness. No neuropathy.   Psychiatric:    No depression, anxiety, suicidal ideation, or paranoid ideation.   Endocrine:   No cold or heat intolerance, polydipsia, hirsutism, change in libido, or flushing.   Hematology/Lymphatic:  No bleeding or lymphadenopathy.    Allergy/Immunology:  No rhinitis or hives.     Physical Exam   Vitals:  Blood pressure 131/87, resp. rate 16, height 1.753 m (5' 9\"), weight 93 kg (205 lb), SpO2 100 %.    General:  Alert and oriented to person, place and time   Airway: Normal oropharyngeal airway and neck mobility   Lungs:  Clear bilaterally   Heart:  Regular rate and rhythm   Abdomen: Soft, NT, ND, no masses   Extremities: Warm, good capillary refill    ASA Grade: II (mild systemic disease)    Impression: Cleared for use of conscious sedation for colorectal polyp surveillance    Plan: Proceed with colonoscopy     Merna Méndez MD  [facility]  [phone]  "

## 2022-09-09 ASSESSMENT — ACTIVITIES OF DAILY LIVING (ADL)
ADLS_ACUITY_SCORE: 35

## 2022-09-09 NOTE — TELEPHONE ENCOUNTER
Per chart review, PCP responded directly to patient.    Sasha Ingram RN  [facility]

## 2022-09-11 LAB
PATH REPORT.COMMENTS IMP SPEC: NORMAL
PATH REPORT.FINAL DX SPEC: NORMAL
PATH REPORT.GROSS SPEC: NORMAL
PATH REPORT.MICROSCOPIC SPEC OTHER STN: NORMAL
PATH REPORT.RELEVANT HX SPEC: NORMAL
PHOTO IMAGE: NORMAL

## 2022-10-04 ENCOUNTER — OFFICE VISIT (OUTPATIENT)
Dept: FAMILY MEDICINE | Facility: CLINIC | Age: 62
End: 2022-10-04
Payer: COMMERCIAL

## 2022-10-04 VITALS
BODY MASS INDEX: 31.07 KG/M2 | HEIGHT: 69 IN | DIASTOLIC BLOOD PRESSURE: 88 MMHG | WEIGHT: 209.8 LBS | TEMPERATURE: 97 F | SYSTOLIC BLOOD PRESSURE: 135 MMHG | HEART RATE: 67 BPM | OXYGEN SATURATION: 99 %

## 2022-10-04 DIAGNOSIS — Z01.818 PREOP GENERAL PHYSICAL EXAM: Primary | ICD-10-CM

## 2022-10-04 DIAGNOSIS — M17.12 PRIMARY OSTEOARTHRITIS OF LEFT KNEE: ICD-10-CM

## 2022-10-04 PROCEDURE — 99214 OFFICE O/P EST MOD 30 MIN: CPT | Performed by: PHYSICIAN ASSISTANT

## 2022-10-04 ASSESSMENT — PAIN SCALES - GENERAL: PAINLEVEL: NO PAIN (0)

## 2022-10-04 NOTE — PROGRESS NOTES
[address] [address]  Phone: [phone]  Primary Provider: Justin Broussard  Pre-op Performing Provider: CAITY VILLARREAL    956}  PREOPERATIVE EVALUATION:  Today's date: 10/4/2022    Aashish Aguilar is a 62 year old male who presents for a preoperative evaluation.    Surgical Information:  Surgery/Procedure:Left total knee arthroplasty  Surgery Location:  or  Surgeon: Nathen oK  Surgery Date: 10/17/2022  Time of Surgery:7:30 am  Where patient plans to recover: At home with family  Fax number for surgical facility: Note does not need to be faxed, will be available electronically in Epic.    Type of Anesthesia Anticipated: General    Assessment & Plan     The proposed surgical procedure is considered INTERMEDIATE risk.    Problem List Items Addressed This Visit    None     Visit Diagnoses     Preop general physical exam    -  Primary    Primary osteoarthritis of left knee                       Medication Instructions:  Patient is to take all scheduled medications on the day of surgery EXCEPT for modifications listed below:   - Diuretics: HOLD on the day of surgery.   - Ibuprofen STOP taking 3 days prior to surgery    RECOMMENDATION:  APPROVAL GIVEN to proceed with proposed procedure, without further diagnostic evaluation.              14 minutes spent on the date of the encounter doing chart review, history and exam, documentation and further activities per the note        Subjective     HPI related to upcoming procedure: left knee pain    Preop Questions 10/2/2022   1. Have you ever had a heart attack or stroke? No   2. Have you ever had surgery on your heart or blood vessels, such as a stent placement, a coronary artery bypass, or surgery on an artery in your head, neck, heart, or legs? No   3. Do you have chest pain with activity? No   4. Do you have a history of  heart failure? No   5. Do you currently have a cold,  bronchitis or symptoms of other infection? No   6. Do you have a cough, shortness of breath, or wheezing? No   7. Do you or anyone in your family have previous history of blood clots? No   8. Do you or does anyone in your family have a serious bleeding problem such as prolonged bleeding following surgeries or cuts? No   9. Have you ever had problems with anemia or been told to take iron pills? No   10. Have you had any abnormal blood loss such as black, tarry or bloody stools? No   11. Have you ever had a blood transfusion? No   12. Are you willing to have a blood transfusion if it is medically needed before, during, or after your surgery? Yes   13. Have you or any of your relatives ever had problems with anesthesia? No   14. Do you have sleep apnea, excessive snoring or daytime drowsiness? No   15. Do you have any artifical heart valves or other implanted medical devices like a pacemaker, defibrillator, or continuous glucose monitor? No   16. Do you have artificial joints? No   17. Are you allergic to latex? No       Health Care Directive:  Patient does not have a Health Care Directive or Living Will: Discussed advance care planning with patient; however, patient declined at this time.    Preoperative Review of :   reviewed - no record of controlled substances prescribed.          Review of Systems      Patient Active Problem List    Diagnosis Date Noted     Morbid obesity (H) 06/15/2020     Priority: Medium     Bilateral knee pain 06/15/2020     Priority: Medium     Primary osteoarthritis of both knees 06/15/2020     Priority: Medium     HYPERLIPIDEMIA LDL GOAL <130 10/31/2010     Priority: Medium     Benign neoplasm of colon 06/19/2006     Priority: Medium     HX of Benign Colon Polyps.       Esophageal reflux 06/01/2006     Priority: Medium     Insomnia 06/01/2006     Priority: Medium     Problem list name updated by automated process. Provider to review       Essential hypertension 03/23/2006      "Priority: Medium     Problem list name updated by automated process. Provider to review       Obstructive sleep apnea 2006     Priority: Medium     Problem list name updated by automated process. Provider to review       Mixed hyperlipidemia 2003     Priority: Medium      Past Medical History:   Diagnosis Date     History of colonic polyps      Hypertension      Past Surgical History:   Procedure Laterality Date     COLONOSCOPY       COLONOSCOPY N/A 2022    Procedure: COLONOSCOPY, FLEXIBLE, WITH LESION REMOVAL USING SNARE;  Surgeon: Merna Méndez MD;  Location: [facility] NONSPECIFIC PROCEDURE  1991    Left knee arthroscopy -- Abstracted 02     [facility] NONSPECIFIC PROCEDURE  2002    Right knee arthroscopy -- Abstracted 02     Current Outpatient Medications   Medication Sig Dispense Refill     lisinopril-hydrochlorothiazide (ZESTORETIC) 20-12.5 MG tablet Take 1 tablet by mouth daily 90 tablet 1     pravastatin (PRAVACHOL) 40 MG tablet Take 1 tablet (40 mg) by mouth daily 90 tablet 0     traZODone (DESYREL) 50 MG tablet TAKE 1 TABLET(50 MG) BY MOUTH AT BEDTIME 30 tablet 1       Allergies   Allergen Reactions     No Known Drug Allergies         Social History     Tobacco Use     Smoking status: Former Smoker     Quit date: 2000     Years since quittin.4     Smokeless tobacco: Never Used   Substance Use Topics     Alcohol use: Yes     Comment: 1drink a day       History   Drug Use No         Objective     /88   Pulse 67   Temp 97  F (36.1  C) (Temporal)   Ht 1.753 m (5' 9\")   Wt 95.2 kg (209 lb 12.8 oz)   SpO2 99%   BMI 30.98 kg/m      Physical Exam  Constitutional:       General: He is not in acute distress.     Appearance: He is well-developed. He is not diaphoretic.   HENT:      Head: Normocephalic.      Right Ear: External ear normal.      Left Ear: External ear normal.      Nose: Nose normal.   Eyes:      Conjunctiva/sclera: Conjunctivae normal. "   Cardiovascular:      Rate and Rhythm: Normal rate and regular rhythm.      Heart sounds: Normal heart sounds.   Pulmonary:      Effort: Pulmonary effort is normal.      Breath sounds: Normal breath sounds.   Musculoskeletal:      Cervical back: Normal range of motion.   Skin:     General: Skin is warm and dry.   Neurological:      Mental Status: He is alert and oriented to person, place, and time.   Psychiatric:         Judgment: Judgment normal.           Recent Labs   Lab Test 08/31/22  1319 04/12/22  1600   HGB 15.1 15.1    315    137   POTASSIUM 3.9 4.1   CR 1.03 0.99   A1C 5.4 5.7*        Diagnostics:  Labs pending at this time.  Results will be reviewed when available.   No EKG required, no history of coronary heart disease, significant arrhythmia, peripheral arterial disease or other structural heart disease.    Revised Cardiac Risk Index (RCRI):  The patient has the following serious cardiovascular risks for perioperative complications:   - No serious cardiac risks = 0 points     RCRI Interpretation: 0 points: Class I (very low risk - 0.4% complication rate)           Signed Electronically by: Isi Joiner PA-C  Copy of this evaluation report is provided to requesting physician.

## 2022-10-04 NOTE — H&P (VIEW-ONLY)
[address] [address]  Phone: [phone]  Primary Provider: Justin Broussard  Pre-op Performing Provider: CAITY VILLARREAL    956}  PREOPERATIVE EVALUATION:  Today's date: 10/4/2022    Aashish Aguilar is a 62 year old male who presents for a preoperative evaluation.    Surgical Information:  Surgery/Procedure:Left total knee arthroplasty  Surgery Location:  or  Surgeon: Nathen Ko  Surgery Date: 10/17/2022  Time of Surgery:7:30 am  Where patient plans to recover: At home with family  Fax number for surgical facility: Note does not need to be faxed, will be available electronically in Epic.    Type of Anesthesia Anticipated: General    Assessment & Plan     The proposed surgical procedure is considered INTERMEDIATE risk.    Problem List Items Addressed This Visit    None     Visit Diagnoses     Preop general physical exam    -  Primary    Primary osteoarthritis of left knee                       Medication Instructions:  Patient is to take all scheduled medications on the day of surgery EXCEPT for modifications listed below:   - Diuretics: HOLD on the day of surgery.   - Ibuprofen STOP taking 3 days prior to surgery    RECOMMENDATION:  APPROVAL GIVEN to proceed with proposed procedure, without further diagnostic evaluation.              14 minutes spent on the date of the encounter doing chart review, history and exam, documentation and further activities per the note        Subjective     HPI related to upcoming procedure: left knee pain    Preop Questions 10/2/2022   1. Have you ever had a heart attack or stroke? No   2. Have you ever had surgery on your heart or blood vessels, such as a stent placement, a coronary artery bypass, or surgery on an artery in your head, neck, heart, or legs? No   3. Do you have chest pain with activity? No   4. Do you have a history of  heart failure? No   5. Do you currently have a cold,  bronchitis or symptoms of other infection? No   6. Do you have a cough, shortness of breath, or wheezing? No   7. Do you or anyone in your family have previous history of blood clots? No   8. Do you or does anyone in your family have a serious bleeding problem such as prolonged bleeding following surgeries or cuts? No   9. Have you ever had problems with anemia or been told to take iron pills? No   10. Have you had any abnormal blood loss such as black, tarry or bloody stools? No   11. Have you ever had a blood transfusion? No   12. Are you willing to have a blood transfusion if it is medically needed before, during, or after your surgery? Yes   13. Have you or any of your relatives ever had problems with anesthesia? No   14. Do you have sleep apnea, excessive snoring or daytime drowsiness? No   15. Do you have any artifical heart valves or other implanted medical devices like a pacemaker, defibrillator, or continuous glucose monitor? No   16. Do you have artificial joints? No   17. Are you allergic to latex? No       Health Care Directive:  Patient does not have a Health Care Directive or Living Will: Discussed advance care planning with patient; however, patient declined at this time.    Preoperative Review of :   reviewed - no record of controlled substances prescribed.          Review of Systems      Patient Active Problem List    Diagnosis Date Noted     Morbid obesity (H) 06/15/2020     Priority: Medium     Bilateral knee pain 06/15/2020     Priority: Medium     Primary osteoarthritis of both knees 06/15/2020     Priority: Medium     HYPERLIPIDEMIA LDL GOAL <130 10/31/2010     Priority: Medium     Benign neoplasm of colon 06/19/2006     Priority: Medium     HX of Benign Colon Polyps.       Esophageal reflux 06/01/2006     Priority: Medium     Insomnia 06/01/2006     Priority: Medium     Problem list name updated by automated process. Provider to review       Essential hypertension 03/23/2006      "Priority: Medium     Problem list name updated by automated process. Provider to review       Obstructive sleep apnea 2006     Priority: Medium     Problem list name updated by automated process. Provider to review       Mixed hyperlipidemia 2003     Priority: Medium      Past Medical History:   Diagnosis Date     History of colonic polyps      Hypertension      Past Surgical History:   Procedure Laterality Date     COLONOSCOPY       COLONOSCOPY N/A 2022    Procedure: COLONOSCOPY, FLEXIBLE, WITH LESION REMOVAL USING SNARE;  Surgeon: Merna Méndez MD;  Location: [facility] NONSPECIFIC PROCEDURE  1991    Left knee arthroscopy -- Abstracted 02     [facility] NONSPECIFIC PROCEDURE  2002    Right knee arthroscopy -- Abstracted 02     Current Outpatient Medications   Medication Sig Dispense Refill     lisinopril-hydrochlorothiazide (ZESTORETIC) 20-12.5 MG tablet Take 1 tablet by mouth daily 90 tablet 1     pravastatin (PRAVACHOL) 40 MG tablet Take 1 tablet (40 mg) by mouth daily 90 tablet 0     traZODone (DESYREL) 50 MG tablet TAKE 1 TABLET(50 MG) BY MOUTH AT BEDTIME 30 tablet 1       Allergies   Allergen Reactions     No Known Drug Allergies         Social History     Tobacco Use     Smoking status: Former Smoker     Quit date: 2000     Years since quittin.4     Smokeless tobacco: Never Used   Substance Use Topics     Alcohol use: Yes     Comment: 1drink a day       History   Drug Use No         Objective     /88   Pulse 67   Temp 97  F (36.1  C) (Temporal)   Ht 1.753 m (5' 9\")   Wt 95.2 kg (209 lb 12.8 oz)   SpO2 99%   BMI 30.98 kg/m      Physical Exam  Constitutional:       General: He is not in acute distress.     Appearance: He is well-developed. He is not diaphoretic.   HENT:      Head: Normocephalic.      Right Ear: External ear normal.      Left Ear: External ear normal.      Nose: Nose normal.   Eyes:      Conjunctiva/sclera: Conjunctivae normal. "   Cardiovascular:      Rate and Rhythm: Normal rate and regular rhythm.      Heart sounds: Normal heart sounds.   Pulmonary:      Effort: Pulmonary effort is normal.      Breath sounds: Normal breath sounds.   Musculoskeletal:      Cervical back: Normal range of motion.   Skin:     General: Skin is warm and dry.   Neurological:      Mental Status: He is alert and oriented to person, place, and time.   Psychiatric:         Judgment: Judgment normal.           Recent Labs   Lab Test 08/31/22  1319 04/12/22  1600   HGB 15.1 15.1    315    137   POTASSIUM 3.9 4.1   CR 1.03 0.99   A1C 5.4 5.7*        Diagnostics:  Labs pending at this time.  Results will be reviewed when available.   No EKG required, no history of coronary heart disease, significant arrhythmia, peripheral arterial disease or other structural heart disease.    Revised Cardiac Risk Index (RCRI):  The patient has the following serious cardiovascular risks for perioperative complications:   - No serious cardiac risks = 0 points     RCRI Interpretation: 0 points: Class I (very low risk - 0.4% complication rate)           Signed Electronically by: Isi Joiner PA-C  Copy of this evaluation report is provided to requesting physician.

## 2022-10-12 NOTE — PROGRESS NOTES
Pre-op Total Joint Patient Screening    1. Do you have a ride available to come to the hospital the day after your surgery by 8am with anticipated discharge of 11am? Y  2. What is the name of this person? Alex  3. Do you have a  set up after surgery? Y  4. Will your  be the same person that gives you a ride home after surgery? Y  5. Have you received the Joint Replacement Guidebook? Y  6. Do you have any questions about your guidebook? N  7. Have you activated your [employer] account? Y  8. Have you signed up for MY Chart access? Y

## 2022-10-14 ENCOUNTER — LAB (OUTPATIENT)
Dept: URGENT CARE | Facility: URGENT CARE | Age: 62
End: 2022-10-14
Payer: COMMERCIAL

## 2022-10-14 DIAGNOSIS — Z20.822 ENCOUNTER FOR LABORATORY TESTING FOR COVID-19 VIRUS: ICD-10-CM

## 2022-10-14 PROCEDURE — U0005 INFEC AGEN DETEC AMPLI PROBE: HCPCS

## 2022-10-14 PROCEDURE — U0003 INFECTIOUS AGENT DETECTION BY NUCLEIC ACID (DNA OR RNA); SEVERE ACUTE RESPIRATORY SYNDROME CORONAVIRUS 2 (SARS-COV-2) (CORONAVIRUS DISEASE [COVID-19]), AMPLIFIED PROBE TECHNIQUE, MAKING USE OF HIGH THROUGHPUT TECHNOLOGIES AS DESCRIBED BY CMS-2020-01-R: HCPCS

## 2022-10-14 RX ORDER — ACETAMINOPHEN 500 MG
500-1000 TABLET ORAL EVERY 6 HOURS PRN
Status: ON HOLD | COMMUNITY
End: 2022-10-18

## 2022-10-14 RX ORDER — OMEGA-3 FATTY ACIDS/FISH OIL 300-1000MG
200-400 CAPSULE ORAL EVERY 4 HOURS PRN
Status: ON HOLD | COMMUNITY
End: 2022-12-13

## 2022-10-14 NOTE — PROGRESS NOTES
PTA medications updated by Medication Scribe prior to surgery via phone call with patient (last doses completed by Nurse)     Medication history sources: Patient, Surescripts, H&P and Patient's home med list  In the past week, patient estimated taking medication this percent of the time: Greater than 90%  Adherence assessment: N/A Not Observed    Significant changes made to the medication list:  None      Additional medication history information:   None    Medication reconciliation completed by provider prior to medication history? No    Time spent in this activity: 30 minutes    The information provided in this note is only as accurate as the sources available at the time of update(s)    Prior to Admission medications    Medication Sig Last Dose Taking? Auth Provider Long Term End Date   acetaminophen (TYLENOL) 500 MG tablet Take 500-1,000 mg by mouth every 6 hours as needed for mild pain 10/14/2022 at prn Yes Reported, Patient     diphenhydrAMINE-acetaminophen (TYLENOL PM)  MG tablet Take 1 tablet by mouth nightly as needed for sleep 10/14/2022 at pm Yes Reported, Patient     ibuprofen (ADVIL/MOTRIN) 200 MG capsule Take 200-400 mg by mouth every 4 hours as needed for fever 10/13/2022 at prn Yes Reported, Patient     lisinopril-hydrochlorothiazide (ZESTORETIC) 20-12.5 MG tablet Take 1 tablet by mouth daily  at am Yes Justin Broussard MD Yes    pravastatin (PRAVACHOL) 40 MG tablet Take 1 tablet (40 mg) by mouth daily  at am Yes Justin Broussard MD Yes    traZODone (DESYREL) 50 MG tablet TAKE 1 TABLET(50 MG) BY MOUTH AT BEDTIME  at pm Yes Justin Broussard MD Yes      Medication history completed by:    Brian Vickers CPhT  Medication Scribe  [facility]

## 2022-10-15 LAB — SARS-COV-2 RNA RESP QL NAA+PROBE: NEGATIVE

## 2022-10-16 ENCOUNTER — ANESTHESIA EVENT (OUTPATIENT)
Dept: SURGERY | Facility: CLINIC | Age: 62
End: 2022-10-16
Payer: COMMERCIAL

## 2022-10-17 ENCOUNTER — APPOINTMENT (OUTPATIENT)
Dept: GENERAL RADIOLOGY | Facility: CLINIC | Age: 62
End: 2022-10-17
Attending: ORTHOPAEDIC SURGERY
Payer: COMMERCIAL

## 2022-10-17 ENCOUNTER — HOSPITAL ENCOUNTER (OUTPATIENT)
Facility: CLINIC | Age: 62
Discharge: HOME | End: 2022-10-18
Attending: ORTHOPAEDIC SURGERY | Admitting: ORTHOPAEDIC SURGERY
Payer: COMMERCIAL

## 2022-10-17 ENCOUNTER — APPOINTMENT (OUTPATIENT)
Dept: PHYSICAL THERAPY | Facility: CLINIC | Age: 62
End: 2022-10-17
Attending: ORTHOPAEDIC SURGERY
Payer: COMMERCIAL

## 2022-10-17 ENCOUNTER — ANESTHESIA (OUTPATIENT)
Dept: SURGERY | Facility: CLINIC | Age: 62
End: 2022-10-17
Payer: COMMERCIAL

## 2022-10-17 DIAGNOSIS — M25.562 CHRONIC PAIN OF BOTH KNEES: ICD-10-CM

## 2022-10-17 DIAGNOSIS — M25.561 CHRONIC PAIN OF BOTH KNEES: ICD-10-CM

## 2022-10-17 DIAGNOSIS — G89.29 CHRONIC PAIN OF BOTH KNEES: ICD-10-CM

## 2022-10-17 DIAGNOSIS — M17.0 PRIMARY OSTEOARTHRITIS OF BOTH KNEES: Primary | ICD-10-CM

## 2022-10-17 LAB
ANION GAP SERPL CALCULATED.3IONS-SCNC: 4 MMOL/L (ref 3–14)
BUN SERPL-MCNC: 14 MG/DL (ref 7–30)
CALCIUM SERPL-MCNC: 9.2 MG/DL (ref 8.5–10.1)
CHLORIDE BLD-SCNC: 103 MMOL/L (ref 94–109)
CO2 SERPL-SCNC: 28 MMOL/L (ref 20–32)
CREAT SERPL-MCNC: 0.96 MG/DL (ref 0.66–1.25)
GFR SERPL CREATININE-BSD FRML MDRD: 89 ML/MIN/1.73M2
GLUCOSE BLD-MCNC: 121 MG/DL (ref 70–99)
HGB BLD-MCNC: 14.2 G/DL (ref 13.3–17.7)
PLATELET # BLD AUTO: 314 10E3/UL (ref 150–450)
POTASSIUM BLD-SCNC: 3.6 MMOL/L (ref 3.4–5.3)
POTASSIUM BLD-SCNC: 4.4 MMOL/L (ref 3.4–5.3)
SODIUM SERPL-SCNC: 135 MMOL/L (ref 133–144)

## 2022-10-17 PROCEDURE — 85018 HEMOGLOBIN: CPT | Performed by: ANESTHESIOLOGY

## 2022-10-17 PROCEDURE — 999N000141 HC STATISTIC PRE-PROCEDURE NURSING ASSESSMENT: Performed by: ORTHOPAEDIC SURGERY

## 2022-10-17 PROCEDURE — 84132 ASSAY OF SERUM POTASSIUM: CPT | Performed by: ORTHOPAEDIC SURGERY

## 2022-10-17 PROCEDURE — 710N000009 HC RECOVERY PHASE 1, LEVEL 1, PER MIN: Performed by: ORTHOPAEDIC SURGERY

## 2022-10-17 PROCEDURE — 36415 COLL VENOUS BLD VENIPUNCTURE: CPT | Performed by: ORTHOPAEDIC SURGERY

## 2022-10-17 PROCEDURE — 272N000001 HC OR GENERAL SUPPLY STERILE: Performed by: ORTHOPAEDIC SURGERY

## 2022-10-17 PROCEDURE — 250N000011 HC RX IP 250 OP 636: Performed by: ORTHOPAEDIC SURGERY

## 2022-10-17 PROCEDURE — 84132 ASSAY OF SERUM POTASSIUM: CPT | Performed by: ANESTHESIOLOGY

## 2022-10-17 PROCEDURE — 258N000003 HC RX IP 258 OP 636: Performed by: ANESTHESIOLOGY

## 2022-10-17 PROCEDURE — 97530 THERAPEUTIC ACTIVITIES: CPT | Mod: GP

## 2022-10-17 PROCEDURE — 85049 AUTOMATED PLATELET COUNT: CPT | Performed by: ANESTHESIOLOGY

## 2022-10-17 PROCEDURE — 250N000009 HC RX 250: Performed by: ANESTHESIOLOGY

## 2022-10-17 PROCEDURE — 258N000003 HC RX IP 258 OP 636: Performed by: NURSE ANESTHETIST, CERTIFIED REGISTERED

## 2022-10-17 PROCEDURE — 250N000011 HC RX IP 250 OP 636: Performed by: NURSE ANESTHETIST, CERTIFIED REGISTERED

## 2022-10-17 PROCEDURE — 999N000063 XR KNEE PORT LEFT 1/2 VIEWS: Mod: LT

## 2022-10-17 PROCEDURE — 999N000010 HC STATISTIC ANES STAT CODE-CRNA PER MINUTE: Performed by: ORTHOPAEDIC SURGERY

## 2022-10-17 PROCEDURE — 97116 GAIT TRAINING THERAPY: CPT | Mod: GP

## 2022-10-17 PROCEDURE — 258N000001 HC RX 258: Performed by: ORTHOPAEDIC SURGERY

## 2022-10-17 PROCEDURE — 250N000013 HC RX MED GY IP 250 OP 250 PS 637: Performed by: ORTHOPAEDIC SURGERY

## 2022-10-17 PROCEDURE — 250N000009 HC RX 250: Performed by: ORTHOPAEDIC SURGERY

## 2022-10-17 PROCEDURE — C1776 JOINT DEVICE (IMPLANTABLE): HCPCS | Performed by: ORTHOPAEDIC SURGERY

## 2022-10-17 PROCEDURE — 97161 PT EVAL LOW COMPLEX 20 MIN: CPT | Mod: GP

## 2022-10-17 PROCEDURE — 250N000009 HC RX 250: Performed by: NURSE ANESTHETIST, CERTIFIED REGISTERED

## 2022-10-17 PROCEDURE — 36415 COLL VENOUS BLD VENIPUNCTURE: CPT | Performed by: ANESTHESIOLOGY

## 2022-10-17 PROCEDURE — 258N000003 HC RX IP 258 OP 636: Performed by: ORTHOPAEDIC SURGERY

## 2022-10-17 PROCEDURE — 360N000077 HC SURGERY LEVEL 4, PER MIN: Performed by: ORTHOPAEDIC SURGERY

## 2022-10-17 PROCEDURE — 97110 THERAPEUTIC EXERCISES: CPT | Mod: GP

## 2022-10-17 PROCEDURE — 370N000017 HC ANESTHESIA TECHNICAL FEE, PER MIN: Performed by: ORTHOPAEDIC SURGERY

## 2022-10-17 PROCEDURE — 250N000011 HC RX IP 250 OP 636: Performed by: ANESTHESIOLOGY

## 2022-10-17 PROCEDURE — C1713 ANCHOR/SCREW BN/BN,TIS/BN: HCPCS | Performed by: ORTHOPAEDIC SURGERY

## 2022-10-17 DEVICE — IMP COMP FEMORAL VANGARD BIOM FIXATION 183099: Type: IMPLANTABLE DEVICE | Site: KNEE | Status: FUNCTIONAL

## 2022-10-17 DEVICE — IMPLANTABLE DEVICE
Type: IMPLANTABLE DEVICE | Site: KNEE | Status: FUNCTIONAL
Brand: VANGUARD® KNEE SYSTEM

## 2022-10-17 DEVICE — SIMPLEX® HV IS A FAST-SETTING ACRYLIC RESIN FOR USE IN BONE SURGERY. MIXING THE TWO SEPARATE STERILE COMPONENTS PRODUCES A DUCTILE BONE CEMENT WHICH, AFTER HARDENING, FIXES THE IMPLANT AND TRANSFERS STRESSES PRODUCED DURING MOVEMENT EVENLY TO THE BONE. SIMPLEX® HV CEMENT POWDER ALSO CONTAINS INSOLUBLE ZIRCONIUM DIOXIDE AS AN X-RAY CONTRAST MEDIUM. SIMPLEX® HV DOES NOT EMIT A SIGNAL AND DOES NOT POSE A SAFETY RISK IN A MAGNETIC RESONANCE ENVIRONMENT.
Type: IMPLANTABLE DEVICE | Site: KNEE | Status: FUNCTIONAL
Brand: SIMPLEX HV

## 2022-10-17 RX ORDER — HYDROMORPHONE HCL IN WATER/PF 6 MG/30 ML
0.4 PATIENT CONTROLLED ANALGESIA SYRINGE INTRAVENOUS
Status: DISCONTINUED | OUTPATIENT
Start: 2022-10-17 | End: 2022-10-18 | Stop reason: HOSPADM

## 2022-10-17 RX ORDER — BISACODYL 10 MG
10 SUPPOSITORY, RECTAL RECTAL DAILY PRN
Status: DISCONTINUED | OUTPATIENT
Start: 2022-10-17 | End: 2022-10-18 | Stop reason: HOSPADM

## 2022-10-17 RX ORDER — OXYCODONE HYDROCHLORIDE 5 MG/1
5-10 TABLET ORAL EVERY 4 HOURS PRN
Qty: 33 TABLET | Refills: 0 | Status: SHIPPED | OUTPATIENT
Start: 2022-10-17 | End: 2022-10-18

## 2022-10-17 RX ORDER — TRANEXAMIC ACID 10 MG/ML
1 INJECTION, SOLUTION INTRAVENOUS ONCE
Status: DISCONTINUED | OUTPATIENT
Start: 2022-10-17 | End: 2022-10-17

## 2022-10-17 RX ORDER — SODIUM CHLORIDE, SODIUM LACTATE, POTASSIUM CHLORIDE, CALCIUM CHLORIDE 600; 310; 30; 20 MG/100ML; MG/100ML; MG/100ML; MG/100ML
INJECTION, SOLUTION INTRAVENOUS CONTINUOUS
Status: DISCONTINUED | OUTPATIENT
Start: 2022-10-17 | End: 2022-10-17

## 2022-10-17 RX ORDER — CEFAZOLIN SODIUM 2 G/100ML
2 INJECTION, SOLUTION INTRAVENOUS EVERY 8 HOURS
Status: COMPLETED | OUTPATIENT
Start: 2022-10-17 | End: 2022-10-18

## 2022-10-17 RX ORDER — IBUPROFEN 600 MG/1
600 TABLET, FILM COATED ORAL EVERY 6 HOURS PRN
Status: DISCONTINUED | OUTPATIENT
Start: 2022-10-17 | End: 2022-10-18 | Stop reason: HOSPADM

## 2022-10-17 RX ORDER — FENTANYL CITRATE 0.05 MG/ML
25 INJECTION, SOLUTION INTRAMUSCULAR; INTRAVENOUS EVERY 5 MIN PRN
Status: DISCONTINUED | OUTPATIENT
Start: 2022-10-17 | End: 2022-10-17 | Stop reason: HOSPADM

## 2022-10-17 RX ORDER — NALOXONE HYDROCHLORIDE 0.4 MG/ML
0.2 INJECTION, SOLUTION INTRAMUSCULAR; INTRAVENOUS; SUBCUTANEOUS
Status: DISCONTINUED | OUTPATIENT
Start: 2022-10-17 | End: 2022-10-18 | Stop reason: HOSPADM

## 2022-10-17 RX ORDER — HYDROXYZINE HYDROCHLORIDE 25 MG/1
25 TABLET, FILM COATED ORAL EVERY 6 HOURS PRN
Qty: 30 TABLET | Refills: 0 | Status: SHIPPED | OUTPATIENT
Start: 2022-10-17 | End: 2022-10-18

## 2022-10-17 RX ORDER — AMOXICILLIN 250 MG
1 CAPSULE ORAL 2 TIMES DAILY
Status: DISCONTINUED | OUTPATIENT
Start: 2022-10-17 | End: 2022-10-18 | Stop reason: HOSPADM

## 2022-10-17 RX ORDER — TRANEXAMIC ACID 10 MG/ML
1 INJECTION, SOLUTION INTRAVENOUS ONCE
Status: COMPLETED | OUTPATIENT
Start: 2022-10-17 | End: 2022-10-17

## 2022-10-17 RX ORDER — SODIUM CHLORIDE, SODIUM LACTATE, POTASSIUM CHLORIDE, CALCIUM CHLORIDE 600; 310; 30; 20 MG/100ML; MG/100ML; MG/100ML; MG/100ML
INJECTION, SOLUTION INTRAVENOUS CONTINUOUS
Status: DISCONTINUED | OUTPATIENT
Start: 2022-10-17 | End: 2022-10-17 | Stop reason: ALTCHOICE

## 2022-10-17 RX ORDER — ONDANSETRON 2 MG/ML
INJECTION INTRAMUSCULAR; INTRAVENOUS PRN
Status: DISCONTINUED | OUTPATIENT
Start: 2022-10-17 | End: 2022-10-17

## 2022-10-17 RX ORDER — HYDROMORPHONE HCL IN WATER/PF 6 MG/30 ML
0.2 PATIENT CONTROLLED ANALGESIA SYRINGE INTRAVENOUS EVERY 5 MIN PRN
Status: DISCONTINUED | OUTPATIENT
Start: 2022-10-17 | End: 2022-10-17 | Stop reason: HOSPADM

## 2022-10-17 RX ORDER — PROPOFOL 10 MG/ML
INJECTION, EMULSION INTRAVENOUS CONTINUOUS PRN
Status: DISCONTINUED | OUTPATIENT
Start: 2022-10-17 | End: 2022-10-17

## 2022-10-17 RX ORDER — ONDANSETRON 2 MG/ML
4 INJECTION INTRAMUSCULAR; INTRAVENOUS EVERY 6 HOURS PRN
Status: DISCONTINUED | OUTPATIENT
Start: 2022-10-17 | End: 2022-10-18 | Stop reason: HOSPADM

## 2022-10-17 RX ORDER — ONDANSETRON 4 MG/1
4 TABLET, ORALLY DISINTEGRATING ORAL EVERY 30 MIN PRN
Status: DISCONTINUED | OUTPATIENT
Start: 2022-10-17 | End: 2022-10-17 | Stop reason: HOSPADM

## 2022-10-17 RX ORDER — LISINOPRIL AND HYDROCHLOROTHIAZIDE 12.5; 2 MG/1; MG/1
1 TABLET ORAL DAILY
Status: DISCONTINUED | OUTPATIENT
Start: 2022-10-17 | End: 2022-10-18 | Stop reason: HOSPADM

## 2022-10-17 RX ORDER — ONDANSETRON 2 MG/ML
4 INJECTION INTRAMUSCULAR; INTRAVENOUS EVERY 30 MIN PRN
Status: DISCONTINUED | OUTPATIENT
Start: 2022-10-17 | End: 2022-10-17 | Stop reason: HOSPADM

## 2022-10-17 RX ORDER — OXYCODONE HYDROCHLORIDE 5 MG/1
5 TABLET ORAL EVERY 4 HOURS PRN
Status: DISCONTINUED | OUTPATIENT
Start: 2022-10-17 | End: 2022-10-18 | Stop reason: HOSPADM

## 2022-10-17 RX ORDER — ACETAMINOPHEN 325 MG/1
975 TABLET ORAL EVERY 8 HOURS
Status: DISCONTINUED | OUTPATIENT
Start: 2022-10-17 | End: 2022-10-18 | Stop reason: HOSPADM

## 2022-10-17 RX ORDER — VANCOMYCIN HYDROCHLORIDE 1 G/20ML
INJECTION, POWDER, LYOPHILIZED, FOR SOLUTION INTRAVENOUS PRN
Status: DISCONTINUED | OUTPATIENT
Start: 2022-10-17 | End: 2022-10-17 | Stop reason: HOSPADM

## 2022-10-17 RX ORDER — IBUPROFEN 600 MG/1
600 TABLET, FILM COATED ORAL EVERY 6 HOURS PRN
Qty: 30 TABLET | Refills: 0 | Status: SHIPPED | OUTPATIENT
Start: 2022-10-17 | End: 2022-10-18

## 2022-10-17 RX ORDER — POLYETHYLENE GLYCOL 3350 17 G/17G
17 POWDER, FOR SOLUTION ORAL DAILY
Status: DISCONTINUED | OUTPATIENT
Start: 2022-10-18 | End: 2022-10-18 | Stop reason: HOSPADM

## 2022-10-17 RX ORDER — HYDROMORPHONE HCL IN WATER/PF 6 MG/30 ML
0.2 PATIENT CONTROLLED ANALGESIA SYRINGE INTRAVENOUS
Status: DISCONTINUED | OUTPATIENT
Start: 2022-10-17 | End: 2022-10-18 | Stop reason: HOSPADM

## 2022-10-17 RX ORDER — LIDOCAINE 40 MG/G
CREAM TOPICAL
Status: DISCONTINUED | OUTPATIENT
Start: 2022-10-17 | End: 2022-10-17

## 2022-10-17 RX ORDER — AMOXICILLIN 250 MG
1-2 CAPSULE ORAL 2 TIMES DAILY
Qty: 30 TABLET | Refills: 0 | Status: SHIPPED | OUTPATIENT
Start: 2022-10-17 | End: 2022-10-18

## 2022-10-17 RX ORDER — SODIUM CHLORIDE 9 MG/ML
INJECTION, SOLUTION INTRAVENOUS CONTINUOUS
Status: DISCONTINUED | OUTPATIENT
Start: 2022-10-17 | End: 2022-10-18 | Stop reason: HOSPADM

## 2022-10-17 RX ORDER — CEFAZOLIN SODIUM/WATER 2 G/20 ML
2 SYRINGE (ML) INTRAVENOUS
Status: COMPLETED | OUTPATIENT
Start: 2022-10-17 | End: 2022-10-17

## 2022-10-17 RX ORDER — NALOXONE HYDROCHLORIDE 0.4 MG/ML
0.4 INJECTION, SOLUTION INTRAMUSCULAR; INTRAVENOUS; SUBCUTANEOUS
Status: DISCONTINUED | OUTPATIENT
Start: 2022-10-17 | End: 2022-10-18 | Stop reason: HOSPADM

## 2022-10-17 RX ORDER — FENTANYL CITRATE 0.05 MG/ML
50 INJECTION, SOLUTION INTRAMUSCULAR; INTRAVENOUS
Status: DISCONTINUED | OUTPATIENT
Start: 2022-10-17 | End: 2022-10-17

## 2022-10-17 RX ORDER — OXYCODONE HYDROCHLORIDE 5 MG/1
5 TABLET ORAL EVERY 4 HOURS PRN
Status: DISCONTINUED | OUTPATIENT
Start: 2022-10-17 | End: 2022-10-17 | Stop reason: HOSPADM

## 2022-10-17 RX ORDER — MAGNESIUM HYDROXIDE 1200 MG/15ML
LIQUID ORAL PRN
Status: DISCONTINUED | OUTPATIENT
Start: 2022-10-17 | End: 2022-10-17 | Stop reason: HOSPADM

## 2022-10-17 RX ORDER — ONDANSETRON 4 MG/1
4 TABLET, ORALLY DISINTEGRATING ORAL EVERY 6 HOURS PRN
Status: DISCONTINUED | OUTPATIENT
Start: 2022-10-17 | End: 2022-10-18 | Stop reason: HOSPADM

## 2022-10-17 RX ORDER — METHOCARBAMOL 750 MG/1
750 TABLET, FILM COATED ORAL EVERY 6 HOURS PRN
Status: DISCONTINUED | OUTPATIENT
Start: 2022-10-17 | End: 2022-10-18 | Stop reason: HOSPADM

## 2022-10-17 RX ORDER — SODIUM CHLORIDE, SODIUM LACTATE, POTASSIUM CHLORIDE, CALCIUM CHLORIDE 600; 310; 30; 20 MG/100ML; MG/100ML; MG/100ML; MG/100ML
INJECTION, SOLUTION INTRAVENOUS CONTINUOUS
Status: DISCONTINUED | OUTPATIENT
Start: 2022-10-17 | End: 2022-10-17 | Stop reason: HOSPADM

## 2022-10-17 RX ORDER — ACETAMINOPHEN 325 MG/1
650 TABLET ORAL EVERY 4 HOURS PRN
Status: DISCONTINUED | OUTPATIENT
Start: 2022-10-20 | End: 2022-10-18 | Stop reason: HOSPADM

## 2022-10-17 RX ORDER — ACETAMINOPHEN 325 MG/1
650 TABLET ORAL EVERY 4 HOURS PRN
Qty: 100 TABLET | Refills: 0 | Status: SHIPPED | OUTPATIENT
Start: 2022-10-17 | End: 2022-10-18

## 2022-10-17 RX ORDER — LIDOCAINE 40 MG/G
CREAM TOPICAL
Status: DISCONTINUED | OUTPATIENT
Start: 2022-10-17 | End: 2022-10-18 | Stop reason: HOSPADM

## 2022-10-17 RX ORDER — OXYCODONE HYDROCHLORIDE 5 MG/1
10 TABLET ORAL EVERY 4 HOURS PRN
Status: DISCONTINUED | OUTPATIENT
Start: 2022-10-17 | End: 2022-10-18 | Stop reason: HOSPADM

## 2022-10-17 RX ORDER — HYDROXYZINE HYDROCHLORIDE 25 MG/1
25 TABLET, FILM COATED ORAL EVERY 6 HOURS PRN
Status: DISCONTINUED | OUTPATIENT
Start: 2022-10-17 | End: 2022-10-18 | Stop reason: HOSPADM

## 2022-10-17 RX ORDER — LIDOCAINE HYDROCHLORIDE 20 MG/ML
INJECTION, SOLUTION INFILTRATION; PERINEURAL PRN
Status: DISCONTINUED | OUTPATIENT
Start: 2022-10-17 | End: 2022-10-17

## 2022-10-17 RX ORDER — DEXAMETHASONE SODIUM PHOSPHATE 4 MG/ML
INJECTION, SOLUTION INTRA-ARTICULAR; INTRALESIONAL; INTRAMUSCULAR; INTRAVENOUS; SOFT TISSUE PRN
Status: DISCONTINUED | OUTPATIENT
Start: 2022-10-17 | End: 2022-10-17

## 2022-10-17 RX ORDER — CEFAZOLIN SODIUM/WATER 2 G/20 ML
2 SYRINGE (ML) INTRAVENOUS SEE ADMIN INSTRUCTIONS
Status: DISCONTINUED | OUTPATIENT
Start: 2022-10-17 | End: 2022-10-17

## 2022-10-17 RX ORDER — PROCHLORPERAZINE MALEATE 10 MG
10 TABLET ORAL EVERY 6 HOURS PRN
Status: DISCONTINUED | OUTPATIENT
Start: 2022-10-17 | End: 2022-10-18 | Stop reason: HOSPADM

## 2022-10-17 RX ORDER — ACETAMINOPHEN 500 MG
500-1000 TABLET ORAL EVERY 6 HOURS PRN
Status: DISCONTINUED | OUTPATIENT
Start: 2022-10-17 | End: 2022-10-17 | Stop reason: ALTCHOICE

## 2022-10-17 RX ADMIN — ACETAMINOPHEN 975 MG: 325 TABLET, FILM COATED ORAL at 21:48

## 2022-10-17 RX ADMIN — CEFAZOLIN SODIUM 2 G: 2 INJECTION, SOLUTION INTRAVENOUS at 16:17

## 2022-10-17 RX ADMIN — SODIUM CHLORIDE, POTASSIUM CHLORIDE, SODIUM LACTATE AND CALCIUM CHLORIDE: 600; 310; 30; 20 INJECTION, SOLUTION INTRAVENOUS at 06:30

## 2022-10-17 RX ADMIN — TRANEXAMIC ACID 1 G: 10 INJECTION, SOLUTION INTRAVENOUS at 07:35

## 2022-10-17 RX ADMIN — PHENYLEPHRINE HYDROCHLORIDE 100 MCG: 10 INJECTION INTRAVENOUS at 08:18

## 2022-10-17 RX ADMIN — DEXAMETHASONE SODIUM PHOSPHATE 10 MG: 4 INJECTION, SOLUTION INTRA-ARTICULAR; INTRALESIONAL; INTRAMUSCULAR; INTRAVENOUS; SOFT TISSUE at 07:41

## 2022-10-17 RX ADMIN — SODIUM CHLORIDE, POTASSIUM CHLORIDE, SODIUM LACTATE AND CALCIUM CHLORIDE: 600; 310; 30; 20 INJECTION, SOLUTION INTRAVENOUS at 08:22

## 2022-10-17 RX ADMIN — LISINOPRIL AND HYDROCHLOROTHIAZIDE 1 TABLET: 12.5; 2 TABLET ORAL at 14:29

## 2022-10-17 RX ADMIN — BUPIVACAINE HYDROCHLORIDE 10 ML: 5 INJECTION, SOLUTION EPIDURAL; INTRACAUDAL at 07:00

## 2022-10-17 RX ADMIN — METHOCARBAMOL 750 MG: 750 TABLET ORAL at 12:06

## 2022-10-17 RX ADMIN — MIDAZOLAM 2 MG: 1 INJECTION INTRAMUSCULAR; INTRAVENOUS at 07:35

## 2022-10-17 RX ADMIN — ONDANSETRON 4 MG: 2 INJECTION INTRAMUSCULAR; INTRAVENOUS at 07:41

## 2022-10-17 RX ADMIN — MEPIVACAINE HYDROCHLORIDE 3 ML: 20 INJECTION, SOLUTION EPIDURAL; INFILTRATION at 07:38

## 2022-10-17 RX ADMIN — ASPIRIN 325 MG: 325 TABLET, COATED ORAL at 19:10

## 2022-10-17 RX ADMIN — METHOCARBAMOL 750 MG: 750 TABLET ORAL at 20:20

## 2022-10-17 RX ADMIN — PHENYLEPHRINE HYDROCHLORIDE 100 MCG: 10 INJECTION INTRAVENOUS at 07:58

## 2022-10-17 RX ADMIN — PHENYLEPHRINE HYDROCHLORIDE 100 MCG: 10 INJECTION INTRAVENOUS at 07:52

## 2022-10-17 RX ADMIN — MAGNESIUM HYDROXIDE 30 ML: 400 SUSPENSION ORAL at 15:34

## 2022-10-17 RX ADMIN — Medication 2 G: at 07:30

## 2022-10-17 RX ADMIN — SENNOSIDES AND DOCUSATE SODIUM 1 TABLET: 50; 8.6 TABLET ORAL at 20:20

## 2022-10-17 RX ADMIN — HYDROMORPHONE HYDROCHLORIDE 0.4 MG: 0.2 INJECTION, SOLUTION INTRAMUSCULAR; INTRAVENOUS; SUBCUTANEOUS at 12:07

## 2022-10-17 RX ADMIN — MIDAZOLAM HYDROCHLORIDE 2 MG: 1 INJECTION, SOLUTION INTRAMUSCULAR; INTRAVENOUS at 06:53

## 2022-10-17 RX ADMIN — PHENYLEPHRINE HYDROCHLORIDE 100 MCG: 10 INJECTION INTRAVENOUS at 08:08

## 2022-10-17 RX ADMIN — SODIUM CHLORIDE: 9 INJECTION, SOLUTION INTRAVENOUS at 11:55

## 2022-10-17 RX ADMIN — PHENYLEPHRINE HYDROCHLORIDE 100 MCG: 10 INJECTION INTRAVENOUS at 08:31

## 2022-10-17 RX ADMIN — PHENYLEPHRINE HYDROCHLORIDE 100 MCG: 10 INJECTION INTRAVENOUS at 08:10

## 2022-10-17 RX ADMIN — LIDOCAINE HYDROCHLORIDE 60 MG: 20 INJECTION, SOLUTION INFILTRATION; PERINEURAL at 07:39

## 2022-10-17 RX ADMIN — OXYCODONE HYDROCHLORIDE 10 MG: 5 TABLET ORAL at 20:20

## 2022-10-17 RX ADMIN — HYDROXYZINE HYDROCHLORIDE 25 MG: 25 TABLET, FILM COATED ORAL at 15:34

## 2022-10-17 RX ADMIN — TRANEXAMIC ACID 1 G: 10 INJECTION, SOLUTION INTRAVENOUS at 09:04

## 2022-10-17 RX ADMIN — SODIUM CHLORIDE: 9 INJECTION, SOLUTION INTRAVENOUS at 23:02

## 2022-10-17 RX ADMIN — PROPOFOL 150 MCG/KG/MIN: 10 INJECTION, EMULSION INTRAVENOUS at 07:39

## 2022-10-17 RX ADMIN — PHENYLEPHRINE HYDROCHLORIDE 100 MCG: 10 INJECTION INTRAVENOUS at 07:44

## 2022-10-17 RX ADMIN — HYDROMORPHONE HYDROCHLORIDE 0.4 MG: 0.2 INJECTION, SOLUTION INTRAMUSCULAR; INTRAVENOUS; SUBCUTANEOUS at 15:34

## 2022-10-17 RX ADMIN — PHENYLEPHRINE HYDROCHLORIDE 100 MCG: 10 INJECTION INTRAVENOUS at 08:13

## 2022-10-17 RX ADMIN — PHENYLEPHRINE HYDROCHLORIDE 0.3 MCG/KG/MIN: 10 INJECTION INTRAVENOUS at 08:16

## 2022-10-17 RX ADMIN — PHENYLEPHRINE HYDROCHLORIDE 100 MCG: 10 INJECTION INTRAVENOUS at 08:22

## 2022-10-17 RX ADMIN — ACETAMINOPHEN 975 MG: 325 TABLET, FILM COATED ORAL at 14:29

## 2022-10-17 ASSESSMENT — ACTIVITIES OF DAILY LIVING (ADL)
ADLS_ACUITY_SCORE: 20

## 2022-10-17 ASSESSMENT — LIFESTYLE VARIABLES: TOBACCO_USE: 1

## 2022-10-17 NOTE — ANESTHESIA POSTPROCEDURE EVALUATION
Patient: Aashish Aguilar    Procedure: Procedure(s):  LEFT TOTAL KNEE ARTHROPLASTY       Anesthesia Type:  Spinal    Note:  Disposition: Inpatient   Postop Pain Control: Uneventful            Sign Out: Well controlled pain   PONV: No   Neuro/Psych: Uneventful            Sign Out: Acceptable/Baseline neuro status   Airway/Respiratory: Uneventful            Sign Out: Acceptable/Baseline resp. status   CV/Hemodynamics: Uneventful            Sign Out: Acceptable CV status   Other NRE: NONE   DID A NON-ROUTINE EVENT OCCUR? No           Last vitals:  Vitals Value Taken Time   /81 10/17/22 1030   Temp 36.3  C (97.4  F) 10/17/22 1030   Pulse 57 10/17/22 1036   Resp 16 10/17/22 1036   SpO2 100 % 10/17/22 1036   Vitals shown include unvalidated device data.    Electronically Signed By: Zay León MD  October 17, 2022  10:37 AM

## 2022-10-17 NOTE — BRIEF OP NOTE
[facility]    Brief Operative Note    Pre-operative diagnosis: Primary osteoarthritis of left knee [M17.12]  Post-operative diagnosis Same as pre-operative diagnosis    Procedure: Procedure(s):  LEFT TOTAL KNEE ARTHROPLASTY  Surgeon: Surgeon(s) and Role:     * Kirt Ward MD - Primary      ASST   LUZMA walters  Anesthesia: Choice   Spinal   Plus  Adductor canal block  Estimated Blood Loss: Less than 50 ml    Drains: Hemovac  Specimens: * No specimens in log *  Findings:   None.  Complications: None.  Implants: * No implants in log *

## 2022-10-17 NOTE — INTERVAL H&P NOTE
"I have reviewed the surgical (or preoperative) H&P that is linked to this encounter, and examined the patient. There are no significant changes    Clinical Conditions Present on Arrival:  Clinically Significant Risk Factors Present on Admission                   # Obesity: Estimated body mass index is 30.42 kg/m  as calculated from the following:    Height as of this encounter: 1.753 m (5' 9\").    Weight as of this encounter: 93.4 kg (206 lb).       "

## 2022-10-17 NOTE — PROGRESS NOTES
10/17/22 1527   Appointment Info   Signing Clinician's Name / Credentials (PT) Azalea Henrández, PT, DPT   Quick Adds   Quick Adds Certification   Living Environment   People in Home spouse   Current Living Arrangements house   Home Accessibility stairs within home   Number of Stairs, Within Home, Primary nine   Stair Railings, Within Home, Primary railings on both sides of stairs   Transportation Anticipated family or friend will provide   Living Environment Comments Son will be driving pt home and going to be staying with pt for recovery.   Self-Care   Usual Activity Tolerance good   Current Activity Tolerance moderate   Equipment Currently Used at Home none   Fall history within last six months yes   Number of times patient has fallen within last six months 1   Activity/Exercise/Self-Care Comment ind with ADLs and IADLs at baseline. Will need a walker for home. walk in shower with a bench and grab bars.   General Information   Onset of Illness/Injury or Date of Surgery 10/17/22   Referring Physician Kirt Ward MD   Patient/Family Therapy Goals Statement (PT) Planning on going home with family, OP PT starting on Friday this week.   Pertinent History of Current Problem (include personal factors and/or comorbidities that impact the POC) Pt is a 62 y.o. male underwent L TKA on 10/17/22. Pt is POD#0.   Existing Precautions/Restrictions fall;weight bearing   Weight-Bearing Status - LLE weight-bearing as tolerated   Cognition   Affect/Mental Status (Cognition) WFL   Orientation Status (Cognition) oriented x 4   Follows Commands (Cognition) WFL   Pain Assessment   Patient Currently in Pain   (7/10 pain)   Integumentary/Edema   Integumentary/Edema Comments Dressing on L leg appears CDI, hemovac drain in place.   Posture    Posture Not impaired   Range of Motion (ROM)   Range of Motion ROM deficits secondary to surgical procedure;ROM deficits secondary to pain;ROM deficits secondary to swelling   ROM Comment Grossly  WFL BUE and RLE. L knee flex/ext limited due to post op status.   Strength (Manual Muscle Testing)   Strength (Manual Muscle Testing) Able to perform R SLR;Able to perform L SLR   Strength Comments Grossly antigravity strength BUE and LE.   Bed Mobility   Comment, (Bed Mobility) Supine HOB flat>sitting EOB, SBA.   Transfers   Comment, (Transfers) sit>stand to FWW, CGA   Gait/Stairs (Locomotion)   Distance in Feet (Required for LE Total Joints) 5' eval + 125' treatment   Distance in Feet (Gait) 125'   Comment, (Gait/Stairs) Amb with FWW, SBA, step to pattern, decreased step length, decreased knee flexion on L during swing phase, no LOB.   Balance   Balance Comments Able to maintain seated balance unsupported. Able to maintain standing balance in FWW, SBA.   Sensory Examination   Sensory Perception Comments Pt reports some numbness on anterior L shin area and dorsum of L foot.   Clinical Impression   Criteria for Skilled Therapeutic Intervention Yes, treatment indicated   PT Diagnosis (PT) Impaired gait   Influenced by the following impairments pain, decreased functional strength and ROM, decreased activity tolerance   Functional limitations due to impairments pain, falll risk, impaired functional independence, impaired ADLs and IADLs   Clinical Presentation (PT Evaluation Complexity) Stable/Uncomplicated   Clinical Presentation Rationale per MR and clinical judgement   Clinical Decision Making (Complexity) low complexity   Planned Therapy Interventions (PT) balance training;bed mobility training;cryotherapy;gait training;home exercise program;ROM (range of motion);stair training;strengthening;stretching;patient/family education;transfer training;progressive activity/exercise   Anticipated Equipment Needs at Discharge (PT) walker, rolling   Risk & Benefits of therapy have been explained evaluation/treatment results reviewed;care plan/treatment goals reviewed;risks/benefits reviewed;current/potential barriers  reviewed;participants voiced agreement with care plan;participants included;patient   PT Total Evaluation Time   PT Eval, Low Complexity Minutes (01871) 6   Therapy Certification   Start of care date 10/17/22   Certification date from 10/17/22   Certification date to 10/20/22   Medical Diagnosis L TKA   Physical Therapy Goals   PT Frequency 2x/day   PT Predicted Duration/Target Date for Goal Attainment 10/20/22   PT Goals Bed Mobility;Transfers;Gait;Stairs   PT: Bed Mobility Independent;Supine to/from sit;Rolling;Bridging   PT: Transfers Modified independent;Sit to/from stand;Bed to/from chair;Assistive device   PT: Gait Supervision/stand-by assist;Assistive device;Rolling walker;Greater than 200 feet   PT: Stairs Supervision/stand-by assist;4 stairs;Rail on right   Interventions   Interventions Quick Adds Gait Training;Therapeutic Procedure;Therapeutic Activity   Therapeutic Procedure/Exercise   Ther. Procedure: strength, endurance, ROM, flexibillity Minutes (99439) 10   Symptoms Noted During/After Treatment increased pain   Treatment Detail/Skilled Intervention PT: Edu and provided pt with handout of the following exercises for functional strength and ROM benefits. Pt performed 8-10 reps of the following on the L leg. Ankle pumps, quad sets, heel slides, SAQ, SLR. Tactile and verbal cues for muscle activation and correct position of exercise. Pt tolerated well.   Therapeutic Activity   Therapeutic Activities: dynamic activities to improve functional performance Minutes (68992) 10   Symptoms Noted During/After Treatment Increased pain   Treatment Detail/Skilled Intervention Pt agreed to therapy session. Demo for pt STS technique to FWW. Pt completed additional 3 STS to FWW, cues for hand placement and walker safety. Pt supine HOB flat<>sitting EOB additional time during session, SBA, progressing to ind. Pt able to reposition self in bed, ind. Edu pt on icing and positioning of L knee. Placed ice pack on L knee. Pt  left in bed, bed alarm on, all needs in reach, pt tolerated well.   Gait Training   Gait Training Minutes (17719) 10   Symptoms Noted During/After Treatment (Gait Training) fatigue;increased pain   Treatment Detail/Skilled Intervention pt amb in hallway, cues for walker safety and management. Pt progressing from step to pattern to step through pattern. Cues for heel strike on L, cues for knee flexion on L during swing phase. Cues for posture and breathing throughout. Demo for pt stair negotiation. Pt completed set of 4 stairs, step to pattern with R leg leading ascending. Pt tolerated well.   Kane Level (Gait Training) stand-by assist   Physical Assistance Level (Gait Training) supervision;verbal cues;nonverbal cues (demo/gestures);1 person assist   Weight Bearing (Gait Training) weight-bearing as tolerated   Assistive Device (Gait Training) rolling walker   Gait Analysis Deviations decreased german;increased time in double stance;decreased velocity of limb motion;decreased step length   Stair Railings present on right side   Physical Assist/Nonphysical Assist (Stairs) set-up required;supervision;verbal cues;nonverbal cues (demo/gestures);1 person assist   Level of Kane (Stairs) contact guard   PT Discharge Planning   PT Plan review HEP, seated LAQ and heel slides, gait distance, review stairs. Pt needs walker at discharge, order is placed and written on sheet.   PT Rationale for DC Rec Pt tolerated session well. Anticipate pt will require assist as needed, use of walker for ambulation, supervision on stairs at time of discharge.   PT Brief overview of current status SBA bed mobility, amb with FWW, SBA.   Total Session Time   Timed Code Treatment Minutes 30   Total Session Time (sum of timed and untimed services) 36     [facility]  OUTPATIENT PHYSICAL THERAPY EVALUATION  PLAN OF TREATMENT FOR OUTPATIENT REHABILITATION  (COMPLETE FOR INITIAL CLAIMS ONLY)  Patient's Last  Name, First Name, M.I.  YOB: 1960  Aashish Aguilar                        Provider's Name  [facility] Medical Record No.  [id number]                             Onset Date:  10/17/22   Start of Care Date:  10/17/22   Type:     _X_PT   ___OT   ___SLP Medical Diagnosis:  L TKA              PT Diagnosis:  Impaired gait Visits from SOC:  1     See note for plan of treatment, functional goals and certification details    I CERTIFY THE NEED FOR THESE SERVICES FURNISHED UNDER        THIS PLAN OF TREATMENT AND WHILE UNDER MY CARE     (Physician co-signature of this document indicates review and certification of the therapy plan).

## 2022-10-17 NOTE — ANESTHESIA PROCEDURE NOTES
"Intrathecal Procedure Note    Pre-Procedure   Staff -        Anesthesiologist:  Zay León MD       Performed By: anesthesiologist       Location: OR       Pre-Anesthestic Checklist: patient identified, IV checked, site marked, risks and benefits discussed, informed consent, monitors and equipment checked, pre-op evaluation and at physician/surgeon's request  Timeout:       Correct Patient: Yes        Correct Procedure: Yes        Correct Site: Yes        Correct Position: Yes   Procedure Documentation  Procedure: intrathecal       Patient Position: sitting       Patient Prep/Sterile Barriers: sterile gloves, mask, patient draped       Skin prep: Chloraprep (midline approach).       Needle Gauge: 24.        Needle Length (Inches): 4        Spinal Needle Type: Pencan       Introducer used       Introducer: 20 G       # of attempts: 1 and  # of redirects:  1    Assessment/Narrative         Paresthesias: No.       CSF fluid: clear.       Opening pressure was cmH2O while  Sitting.       Comments:  Patient tolerated the procedure well      FOR [employer] ([facility]) ONLY:   Pain Team Contact information: please page the Pain Team Via [employer]. Search \"Pain\". During daytime hours, please page the attending first. At night please page the resident first.    "

## 2022-10-17 NOTE — ANESTHESIA PREPROCEDURE EVALUATION
Anesthesia Pre-Procedure Evaluation    Patient: Aashish Aguilar   MRN: [id number] : 1960        Procedure : Procedure(s):  LEFT TOTAL KNEE ARTHROPLASTY          Past Medical History:   Diagnosis Date     History of colonic polyps      Hypertension       Past Surgical History:   Procedure Laterality Date     COLONOSCOPY       COLONOSCOPY N/A 2022    Procedure: COLONOSCOPY, FLEXIBLE, WITH LESION REMOVAL USING SNARE;  Surgeon: Merna Méndez MD;  Location:  GI     [facility] NONSPECIFIC PROCEDURE  1991    Left knee arthroscopy -- Abstracted 02     ZZC NONSPECIFIC PROCEDURE  2002    Right knee arthroscopy -- Abstracted 02      Allergies   Allergen Reactions     No Known Drug Allergies       Social History     Tobacco Use     Smoking status: Former     Types: Cigarettes     Quit date: 2000     Years since quittin.5     Smokeless tobacco: Never   Substance Use Topics     Alcohol use: Yes     Comment: 1drink a day      Wt Readings from Last 1 Encounters:   10/17/22 93.4 kg (206 lb)        Anesthesia Evaluation            ROS/MED HX  ENT/Pulmonary:     (+) sleep apnea, tobacco use, Past use,     Neurologic:       Cardiovascular:     (+) Dyslipidemia hypertension-----    METS/Exercise Tolerance:     Hematologic:       Musculoskeletal:   (+) arthritis,     GI/Hepatic:     (+) GERD, Asymptomatic on medication,     Renal/Genitourinary:       Endo:     (+) Obesity,     Psychiatric/Substance Use:       Infectious Disease:       Malignancy:       Other:               OUTSIDE LABS:  CBC:   Lab Results   Component Value Date    WBC 3.9 (L) 2022    WBC 3.5 (L) 2022    HGB 15.1 2022    HGB 15.1 2022    HCT 44.2 2022    HCT 44.3 2022     2022     2022     BMP:   Lab Results   Component Value Date     2022     2022    POTASSIUM 3.9 2022    POTASSIUM 4.1 2022    CHLORIDE 99 2022    CHLORIDE 103  04/12/2022    CO2 28 08/31/2022    CO2 28 04/12/2022    BUN 15 08/31/2022    BUN 14 04/12/2022    CR 1.03 08/31/2022    CR 0.99 04/12/2022     (H) 08/31/2022    GLC 90 04/12/2022     COAGS: No results found for: PTT, INR, FIBR  POC: No results found for: BGM, HCG, HCGS  HEPATIC:   Lab Results   Component Value Date    ALBUMIN 4.4 08/31/2022    PROTTOTAL 7.5 08/31/2022    ALT 38 08/31/2022    AST 21 08/31/2022    ALKPHOS 73 08/31/2022    BILITOTAL 0.6 08/31/2022     OTHER:   Lab Results   Component Value Date    A1C 5.4 08/31/2022    JIMI 9.4 08/31/2022    TSH 1.88 08/31/2022       Anesthesia Plan    ASA Status:  2   NPO Status:  NPO Appropriate    Anesthesia Type: Spinal.              Consents    Anesthesia Plan(s) and associated risks, benefits, and realistic alternatives discussed. Questions answered and patient/representative(s) expressed understanding.    - Discussed:     - Discussed with:  Patient         Postoperative Care    Pain management: Multi-modal analgesia.   PONV prophylaxis: Ondansetron (or other 5HT-3)     Comments:                Zay León MD

## 2022-10-17 NOTE — ANESTHESIA CARE TRANSFER NOTE
Patient: Aashish Aguilar    Procedure: Procedure(s):  LEFT TOTAL KNEE ARTHROPLASTY       Diagnosis: Primary osteoarthritis of left knee [M17.12]  Diagnosis Additional Information: No value filed.    Anesthesia Type:   Spinal     Note:      Level of Consciousness: awake  Oxygen Supplementation: face mask    Independent Airway: airway patency satisfactory and stable  Dentition: dentition unchanged  Vital Signs Stable: post-procedure vital signs reviewed and stable  Report to RN Given: handoff report given  Patient transferred to: PACU    Handoff Report: Identifed the Patient, Identified the Reponsible Provider, Reviewed the pertinent medical history, Discussed the surgical course, Reviewed Intra-OP anesthesia mangement and issues during anesthesia, Set expectations for post-procedure period and Allowed opportunity for questions and acknowledgement of understanding      Vitals:  Vitals Value Taken Time   BP 92/52    Temp     Pulse 79    Resp 18    SpO2 100        Electronically Signed By: MYRNA Tolentino CRNA  October 17, 2022  9:41 AM

## 2022-10-18 ENCOUNTER — APPOINTMENT (OUTPATIENT)
Dept: PHYSICAL THERAPY | Facility: CLINIC | Age: 62
End: 2022-10-18
Attending: ORTHOPAEDIC SURGERY
Payer: COMMERCIAL

## 2022-10-18 ENCOUNTER — APPOINTMENT (OUTPATIENT)
Dept: OCCUPATIONAL THERAPY | Facility: CLINIC | Age: 62
End: 2022-10-18
Attending: ORTHOPAEDIC SURGERY
Payer: COMMERCIAL

## 2022-10-18 VITALS
HEART RATE: 67 BPM | WEIGHT: 206 LBS | DIASTOLIC BLOOD PRESSURE: 77 MMHG | TEMPERATURE: 97.8 F | RESPIRATION RATE: 16 BRPM | HEIGHT: 69 IN | OXYGEN SATURATION: 100 % | BODY MASS INDEX: 30.51 KG/M2 | SYSTOLIC BLOOD PRESSURE: 121 MMHG

## 2022-10-18 LAB
GLUCOSE BLDC GLUCOMTR-MCNC: 122 MG/DL (ref 70–99)
HGB BLD-MCNC: 12 G/DL (ref 13.3–17.7)

## 2022-10-18 PROCEDURE — 82962 GLUCOSE BLOOD TEST: CPT

## 2022-10-18 PROCEDURE — 97116 GAIT TRAINING THERAPY: CPT | Mod: GP | Performed by: PHYSICAL THERAPY ASSISTANT

## 2022-10-18 PROCEDURE — 97530 THERAPEUTIC ACTIVITIES: CPT | Mod: GP | Performed by: PHYSICAL THERAPY ASSISTANT

## 2022-10-18 PROCEDURE — 250N000011 HC RX IP 250 OP 636: Performed by: ORTHOPAEDIC SURGERY

## 2022-10-18 PROCEDURE — 250N000013 HC RX MED GY IP 250 OP 250 PS 637: Performed by: ORTHOPAEDIC SURGERY

## 2022-10-18 PROCEDURE — 97110 THERAPEUTIC EXERCISES: CPT | Mod: GP | Performed by: PHYSICAL THERAPY ASSISTANT

## 2022-10-18 PROCEDURE — 36415 COLL VENOUS BLD VENIPUNCTURE: CPT | Performed by: ORTHOPAEDIC SURGERY

## 2022-10-18 PROCEDURE — 85018 HEMOGLOBIN: CPT | Performed by: ORTHOPAEDIC SURGERY

## 2022-10-18 PROCEDURE — 97165 OT EVAL LOW COMPLEX 30 MIN: CPT | Mod: GO | Performed by: OCCUPATIONAL THERAPIST

## 2022-10-18 PROCEDURE — 97535 SELF CARE MNGMENT TRAINING: CPT | Mod: GO | Performed by: OCCUPATIONAL THERAPIST

## 2022-10-18 RX ORDER — ACETAMINOPHEN 325 MG/1
650 TABLET ORAL EVERY 4 HOURS PRN
Qty: 100 TABLET | Refills: 0 | Status: SHIPPED | OUTPATIENT
Start: 2022-10-18

## 2022-10-18 RX ORDER — HYDROXYZINE HYDROCHLORIDE 25 MG/1
25 TABLET, FILM COATED ORAL EVERY 6 HOURS PRN
Qty: 30 TABLET | Refills: 0 | Status: SHIPPED | OUTPATIENT
Start: 2022-10-18 | End: 2022-12-09

## 2022-10-18 RX ORDER — IBUPROFEN 600 MG/1
600 TABLET, FILM COATED ORAL EVERY 6 HOURS PRN
Qty: 30 TABLET | Refills: 0 | Status: SHIPPED | OUTPATIENT
Start: 2022-10-18 | End: 2022-12-09

## 2022-10-18 RX ORDER — OXYCODONE HYDROCHLORIDE 5 MG/1
5-10 TABLET ORAL EVERY 4 HOURS PRN
Qty: 33 TABLET | Refills: 0 | Status: ON HOLD | OUTPATIENT
Start: 2022-10-18 | End: 2022-12-13

## 2022-10-18 RX ORDER — CEPHALEXIN 500 MG/1
500 CAPSULE ORAL 3 TIMES DAILY
Qty: 21 CAPSULE | Refills: 0 | Status: SHIPPED | OUTPATIENT
Start: 2022-10-18 | End: 2022-10-25

## 2022-10-18 RX ORDER — AMOXICILLIN 250 MG
1-2 CAPSULE ORAL 2 TIMES DAILY
Qty: 30 TABLET | Refills: 0 | Status: ON HOLD | OUTPATIENT
Start: 2022-10-18 | End: 2022-12-13

## 2022-10-18 RX ADMIN — LISINOPRIL AND HYDROCHLOROTHIAZIDE 1 TABLET: 12.5; 2 TABLET ORAL at 08:33

## 2022-10-18 RX ADMIN — OXYCODONE HYDROCHLORIDE 10 MG: 5 TABLET ORAL at 11:13

## 2022-10-18 RX ADMIN — CEPHALEXIN 250 MG: 250 CAPSULE ORAL at 11:13

## 2022-10-18 RX ADMIN — ASPIRIN 325 MG: 325 TABLET, COATED ORAL at 08:33

## 2022-10-18 RX ADMIN — CEPHALEXIN 250 MG: 250 CAPSULE ORAL at 05:07

## 2022-10-18 RX ADMIN — SENNOSIDES AND DOCUSATE SODIUM 1 TABLET: 50; 8.6 TABLET ORAL at 08:33

## 2022-10-18 RX ADMIN — ACETAMINOPHEN 975 MG: 325 TABLET, FILM COATED ORAL at 05:05

## 2022-10-18 RX ADMIN — ONDANSETRON 4 MG: 4 TABLET, ORALLY DISINTEGRATING ORAL at 11:13

## 2022-10-18 RX ADMIN — OXYCODONE HYDROCHLORIDE 5 MG: 5 TABLET ORAL at 05:04

## 2022-10-18 RX ADMIN — METHOCARBAMOL 750 MG: 750 TABLET ORAL at 08:51

## 2022-10-18 RX ADMIN — OXYCODONE HYDROCHLORIDE 5 MG: 5 TABLET ORAL at 00:35

## 2022-10-18 RX ADMIN — CEFAZOLIN SODIUM 2 G: 2 INJECTION, SOLUTION INTRAVENOUS at 00:37

## 2022-10-18 RX ADMIN — POLYETHYLENE GLYCOL 3350 17 G: 17 POWDER, FOR SOLUTION ORAL at 08:34

## 2022-10-18 ASSESSMENT — ACTIVITIES OF DAILY LIVING (ADL)
ADLS_ACUITY_SCORE: 20
PREVIOUS_RESPONSIBILITIES: MEAL PREP;HOUSEKEEPING;LAUNDRY;SHOPPING;YARDWORK;MEDICATION MANAGEMENT;FINANCES;DRIVING
ADLS_ACUITY_SCORE: 20

## 2022-10-18 NOTE — PROGRESS NOTES
"ORTHOPEDIC LOWER EXTREMITY PROGRESS NOTE    POD#1  Patient is a 62 year old male who underwent Procedure(s):  LEFT TOTAL KNEE ARTHROPLASTY on 10/17/2022. Pain is well controlled. Tolerating medication well, no nausea or vomiting.  Discharge instructions reviewed.    Vitals:   Blood pressure 121/77, pulse 67, temperature 97.8  F (36.6  C), temperature source Oral, resp. rate 18, height 1.753 m (5' 9\"), weight 93.4 kg (206 lb), SpO2 100 %.  Temp (24hrs), Av.2  F (36.8  C), Min:97.4  F (36.3  C), Max:98.6  F (37  C)      Drains: None    EXAM   The patient is awake and alert.  Calves are soft and non-tender.   Sensation is intact.  Dorsiflexion and plantar flexion is intact.  Foot warm with nl cap refill.  The incision is covered with AG dressing, CDI.     Labs:   Recent Labs   Lab Test 10/18/22  0724 10/17/22  0619 22  1319   HGB 12.0* 14.2 15.1       ASSESSMENT  S/p L TKA   PLAN  1. DVT prophylaxis: aspirin  2. Weight Bearing: WBAT (Weight bearing as tolerated).  3. Anticipated discharge date today . Discharge to Home with Outpatient Treatment.  4. Cont Pain Control Oxycodone and Tylenol    Altagracia Hernandez PA-C  [facility]        "

## 2022-10-18 NOTE — PROGRESS NOTES
Ortho    syable    Pt  To  See  Early this  Am    Dressings   Send home  Extra island dressings    rx  Done    Re ck   16 to  18 days    OK  To  discharge  Drain  Just before  dischrge  With  Dressing  Change.

## 2022-10-18 NOTE — PROGRESS NOTES
Patient refused CPAP for their stay, patient does not use one at home. RT will follow if needed.     Nely Ivory, RT

## 2022-10-18 NOTE — PLAN OF CARE
Patient vital signs are at baseline: Yes  Patient able to ambulate as they were prior to admission or with assist devices provided by therapies during their stay:  Yes  Patient MUST void prior to discharge:  Yes  Patient able to tolerate oral intake:  Yes  Pain has adequate pain control using Oral analgesics:  Yes  Does patient have an identified :  Yes  Has goal D/C date and time been discussed with patient:  Yes    A/Ox4, incision CDI, new dressing applied, CMS intact. Educated regarding incision care, pain and medication management, and follow ups.    Hemovac removed, 3 pcs of island dressing was send with patient.   Discharged at 1130, picked up by his son

## 2022-10-18 NOTE — PROGRESS NOTES
10/18/22 0800   Appointment Info   Signing Clinician's Name / Credentials (OT) Shirley Davis OTRL   Living Environment   People in Home spouse   Current Living Arrangements house   Home Accessibility stairs within home   Number of Stairs, Within Home, Primary nine   Stair Railings, Within Home, Primary railings on both sides of stairs   Transportation Anticipated family or friend will provide   Living Environment Comments Pt reports walk in shower with suction grab bars, built in seat in the back. Pt has standard toilets with raised seat with rails   Self-Care   Usual Activity Tolerance good   Current Activity Tolerance moderate   Equipment Currently Used at Home none   Fall history within last six months yes   Number of times patient has fallen within last six months 1   Activity/Exercise/Self-Care Comment Pt was independent with aDLS and mobility without gait aid prior to admission   Instrumental Activities of Daily Living (IADL)   Previous Responsibilities meal prep;housekeeping;laundry;shopping;yardwork;medication management;finances;driving   IADL Comments Pt family able to assist with IADLS   General Information   Onset of Illness/Injury or Date of Surgery 10/17/22   Referring Physician Kirt Ward MD   Patient/Family Therapy Goal Statement (OT) Pt would like to return to prior level of function   Additional Occupational Profile Info/Pertinent History of Current Problem Pt underwent LTKA on 10/17   Existing Precautions/Restrictions fall   Left Lower Extremity (Weight-bearing Status) weight-bearing as tolerated (WBAT)   Cognitive Status Examination   Orientation Status orientation to person, place and time   Affect/Mental Status (Cognitive) WNL   Follows Commands WNL   Cognitive Status Comments Pt appears cognitively intact with good recall of education   Visual Perception   Visual Impairment/Limitations WFL   Sensory   Sensory Quick Adds sensation intact   Pain Assessment   Patient Currently in Pain  Yes, see Vital Sign flowsheet   Posture   Posture not impaired   Range of Motion Comprehensive   Comment, General Range of Motion LLE impaired, RLE and BUE WFL   Strength Comprehensive (MMT)   Comment, General Manual Muscle Testing (MMT) Assessment LLE impaired, RLE and BUE WFL   Coordination   Coordination Comments Impaired LE   Bed Mobility   Comment (Bed Mobility) SBA   Transfers   Transfer Comments SBA   Balance   Balance Comments Intact with walker   Activities of Daily Living   BADL Assessment/Intervention bathing;lower body dressing   Bathing Assessment/Intervention   Comment, (Bathing) Impaired tolerance for bathing   Lower Body Dressing Assessment/Training   Comment, (Lower Body Dressing) Min assist   Clinical Impression   Criteria for Skilled Therapeutic Interventions Met (OT) Yes, treatment indicated   OT Diagnosis Decline in LE dressing and bathing   Influenced by the following impairments LTKA   OT Problem List-Impairments impacting ADL problems related to;coordination;pain;post-surgical precautions   Assessment of Occupational Performance 1-3 Performance Deficits   Identified Performance Deficits Impaired LE dressing and bathing   Planned Therapy Interventions (OT) ADL retraining   Clinical Decision Making Complexity (OT) low complexity   Anticipated Equipment Needs Upon Discharge (OT)   (Pt owns all necessary AE)   Risk & Benefits of therapy have been explained evaluation/treatment results reviewed;care plan/treatment goals reviewed;risks/benefits reviewed;current/potential barriers reviewed;participants voiced agreement with care plan;participants included;patient   OT Total Evaluation Time   OT Eval, Low Complexity Minutes (56065) 9   OT Goals   Therapy Frequency (OT) One time eval and treatment   OT Predicted Duration/Target Date for Goal Attainment 10/18/22   OT Goals Lower Body Dressing;OT Goal 1   OT: Lower Body Dressing Supervision/stand-by assist;within precautions;Goal Met   OT: Goal 1  Patient will verbalize understanding of alternative bathing transfer techniques and fall prevention to improve showering independence and safety.  (Goal Met)   Interventions   Interventions Quick Adds Self-Care/Home Management   Self-Care/Home Management   Self-Care/Home Mgmt/ADL, Compensatory, Meal Prep Minutes (17714) 14   Symptoms Noted During/After Treatment (Meal Preparation/Planning Training) increased pain   Treatment Detail/Skilled Intervention Ot: Pt educated on LE dressing techniques to improve independence and efficiency with task, with cues pt able to complete with SBA EOB. Pt educated on shower transfer technique to improve safety and efficiency with tutorial, pt verbalized understanding. Pt educated on fall prevention techniques duringADLS to improve safety and independence, pt verbalized understanding. Pt educated on AE to improve ADL independence, pt owns all necessary AE at this time. Pt in bed with nursing present upon OT departure, no further OT related concerns.   OT Discharge Planning   OT Plan DC   OT Discharge Recommendation (DC Rec)   (Defer to ortho MD)   OT Rationale for DC Rec Patient appears safe and able to discharge home with intermittent assist from family with higher level IADLS. Defer to ortho MD for further therapy recommendations.   OT Brief overview of current status SBA sit to stand   Total Session Time   Timed Code Treatment Minutes 14   Total Session Time (sum of timed and untimed services) 23        [facility]  OUTPATIENT OCCUPATIONAL THERAPY  EVALUATION  PLAN OF TREATMENT FOR OUTPATIENT REHABILITATION  (COMPLETE FOR INITIAL CLAIMS ONLY)  Patient's Last Name, First Name, M.I.  YOB: 1960  Aashish Aguilar                          Provider's Name  [facility] Medical Record No.  [id number]                             Onset Date:  10/17/22   Start of Care Date:      Type:     ___PT   _X_OT   ___SLP Medical  Diagnosis:                       OT Diagnosis:  Decline in LE dressing and bathing Visits from SOC:  1     See note for plan of treatment, functional goals and certification details    I CERTIFY THE NEED FOR THESE SERVICES FURNISHED UNDER        THIS PLAN OF TREATMENT AND WHILE UNDER MY CARE     (Physician co-signature of this document indicates review and certification of the therapy plan).

## 2022-10-18 NOTE — PLAN OF CARE
Physical Therapy Discharge Summary    Reason for therapy discharge:    Discharged to home with outpatient therapy.  All goals and outcomes met, no further needs identified.    Progress towards therapy goal(s). See goals on Care Plan in [facility] electronic health record for goal details.  Goals met    Therapy recommendation(s):    Continue home exercise program. Pt is IND with transfers and SBA with gait.  Pt will have assist of sons as needed and OP PT.  PT Brief overview of current status: SBA bed mobility, amb with FWW, SBA.

## 2022-10-18 NOTE — PROGRESS NOTES
Patient vital signs are at baseline: Yes  Patient able to ambulate as they were prior to admission or with assist devices provided by therapies during their stay:  Yes  Patient MUST void prior to discharge:  Yes  Patient able to tolerate oral intake:  Yes  Pain has adequate pain control using Oral analgesics:  Yes  Does patient have an identified :  Yes  Has goal D/C date and time been discussed with patient:  Yes     Hemovac in place. Numbness on LLE.

## 2022-10-18 NOTE — PLAN OF CARE
Occupational Therapy Discharge Summary    Reason for therapy discharge:    All goals and outcomes met, no further needs identified.    Progress towards therapy goal(s). See goals on Care Plan in [facility] electronic health record for goal details.  Goals met    Therapy recommendation(s):    Defer to ortho MD for further therapy recommendations. Pt appears safe and able to discharge home with intermittent assist from family.

## 2022-10-18 NOTE — PLAN OF CARE
Goal Outcome Evaluation:      Plan of Care Reviewed With: patient    Overall Patient Progress: improvingOverall Patient Progress: improving    A/Ox4. VSS ex Dionte HR in high 50s at times, SBP soft in high 90s at times. CMS intact ex weak pedal pulses. Pain managed with Oxycodone given x2 and sched Tylenol. Dressing CDI. Hemovac with 180ml bloody drainage. IV SL. Tolerating fluids and regular diet. Ambulating A1 gb/walker, WBAT.  Discharge home planned for today. Pt said he has PT at 10 and his son should be here around 10 or 11.

## 2022-10-19 NOTE — OP NOTE
Procedure Date: 10/17/2022    PREOPERATIVE DIAGNOSIS:  End-stage tricompartmental osteoarthrosis of the left knee.    POSTOPERATIVE DIAGNOSIS:  End-stage tricompartmental osteoarthrosis of the left knee.    PROCEDURE:  Left total knee arthroplasty.    SURGEON:  Kirt Ward M.D.    ASSISTANT:  PARTHA Payton, MARÍA-C    ANESTHESIA:  Spinal plus adductor canal block.    ANTIBIOTICS:  Two grams Ancef given preoperatively within 30 minutes of the incision.  Tranexamic acid given preop, postop as well.    OTHER MEDICATIONS:  One gram powdered vancomycin placed intraarticularly at the time of closure.  [facility] pain cocktail of Toradol, bupivacaine, saline, 100 mL dose provided.    COMPLICATIONS:  None.     DRAINS:  Hemovac, one drain.    IMPLANTS:  Biomet Vanguard knee, size 72.5 open box femoral component, 79 tibial tray with a fixed I-beam post, 12 mm polyethylene insert, posterior stabilized E poly.  A 37 mm round all-poly patellar button.    INDICATIONS:  Risks and complications have been thoroughly gone over with the patient preop, as well as in the office.  He understands the slight risk of infection, anesthetic reaction, bleeding, nerve or blood vessel injury, revision rates, stiffness, etc.  He has no history of DVT, but we will be using appropriate doses of aspirin, sequential calf compression devices and MELINA hose.    DESCRIPTION OF PROCEDURE:  Risks, options, alternatives discussed.  Timeout was requested.  All team members agreed.  X-rays, consent, medications in the room were all identified and we proceeded in all with mutual agreement.  Tourniquet inflated to 250 of the left leg after elevation and exsanguination.  The patient's exam showed about a 10-12-degree flexion contracture and mild varus fixed.    Midline incision made, medial parapatellar arthrotomy.  Very, very tight joint.  Released plica and interosseous scar tissue.  I reflected the patella, over resected a few millimeters to  size a 37 button.    I then proceeded to flex the knee up, end-stage arthrosis of the entire knee was noted.  Raw eburnated bone.  Because of the varus release to the mid coronal plane laterally, medial corner medially, intramedullary guiding system was used to produce a 5-degree valgus cut of the distal femur.  Corrected it slightly for the hypoplastic medial femoral condyle.  Distal cut was then made.  Anterior, posterior chamfers made.  Box opening placed.  Prepared the tibia.  Meniscectomy was completed.  ACL and PCL both were debrided back.  Then, using preoperative templating, referenced.  Used extramedullary guiding system and put about a 3-degree posterior slope.  We then proceeded to fix I-beam post.  Deepened it slightly for extra cement mantle.  I then proceeded to trials.  A 72.5, 79, 10/12 insert were trialed with a good fit.  Withdrew the trials, drilled multiple cement anchoring holes in the tibia.  Double and triple checked for osteophytes, loose bodies, capsular tightness in the posterior femoral condyle.  These were released.  Popliteus was frayed, so we debrided that out.    Cauterized circumferentially along the areas.  We then proceeded to cement all three components in simultaneously after washing it out with antibiotic saline.  Dried it, cemented all three components in simultaneously using two batches of high viscosity cement.  Once that cured, we proceeded then to remove the debris and excess cement.  Trials completed, 12 mm insert fit best.  Double checked for cement problems.  Everything fit nicely.  Bone plug was placed in the distal femur.  We proceeded to do closure.  We put the powdered vancomycin in the joint with the knee held slightly flexed, proceeded to close it, put powdered vancomycin in the joint and proceeded then to close it.  Multiple interrupted 0 Ethibond followed by #1 running Stratafix.  We then proceeded to 0 Vicryl, 2-0 Vicryl, running subcuticular and then staples in  the skin.  Hemovac was brought out.  Throughout the case, injected the Marcaine and Toradol combination in careful 1-2 mm increments under direct vision.    ESTIMATED BLOOD LOSS:  10 mL    COMPLICATIONS:  None.    COUNTS:  Sponge and needle counts were correct x2.  Pressure applied from toes to groin.  Drain was delayed placing on suction for 2 hours.  We did put 20 mL of the Marcaine mixture into the joint as well.    No complications.  Again, status post left total knee, Biomet Vanguard knee 72.5 posterior stabilized open box femur; 79 tibial tray fixed I-beam post, one locking clip.  Femoral component had 2 pegs and a 12 mm posterior stabilized insert and the patella was a 37 button.  Post-capsular closure test was 0-140 degrees.  Full extension was possible, correction of the varus alignment.    Kirt Ward MD        D: 10/19/2022   T: 10/19/2022   MT: ERICK    Name:     HUNTER GARCIATito  MRN:      [id number]        Account:        [id number]   :      1960           Procedure Date: 10/17/2022     Document: [id number]

## 2022-11-15 ENCOUNTER — OFFICE VISIT (OUTPATIENT)
Dept: FAMILY MEDICINE | Facility: CLINIC | Age: 62
End: 2022-11-15
Payer: COMMERCIAL

## 2022-11-15 VITALS
BODY MASS INDEX: 31.7 KG/M2 | SYSTOLIC BLOOD PRESSURE: 135 MMHG | WEIGHT: 214 LBS | OXYGEN SATURATION: 99 % | DIASTOLIC BLOOD PRESSURE: 83 MMHG | HEART RATE: 87 BPM | RESPIRATION RATE: 16 BRPM | TEMPERATURE: 98.1 F | HEIGHT: 69 IN

## 2022-11-15 DIAGNOSIS — M17.11 PRIMARY OSTEOARTHRITIS OF RIGHT KNEE: ICD-10-CM

## 2022-11-15 DIAGNOSIS — E66.01 MORBID OBESITY (H): ICD-10-CM

## 2022-11-15 DIAGNOSIS — Z01.818 PREOP GENERAL PHYSICAL EXAM: Primary | ICD-10-CM

## 2022-11-15 LAB — HGB BLD-MCNC: 12.2 G/DL (ref 13.3–17.7)

## 2022-11-15 PROCEDURE — 36415 COLL VENOUS BLD VENIPUNCTURE: CPT | Performed by: PHYSICIAN ASSISTANT

## 2022-11-15 PROCEDURE — 85018 HEMOGLOBIN: CPT | Performed by: PHYSICIAN ASSISTANT

## 2022-11-15 PROCEDURE — 99214 OFFICE O/P EST MOD 30 MIN: CPT | Performed by: PHYSICIAN ASSISTANT

## 2022-11-15 ASSESSMENT — PAIN SCALES - GENERAL: PAINLEVEL: NO PAIN (0)

## 2022-11-15 NOTE — PATIENT INSTRUCTIONS
Medication Instructions:  Patient is to take all scheduled medications on the day of surgery EXCEPT for modifications listed below:   - aspirin: Discontinue aspirin 7-10 days prior to procedure to reduce bleeding risk. It should be resumed postoperatively.    - hydrochlorothiazide: HOLD on the day of surgery.   - ibuprofen (Advil, Motrin): HOLD 1 day before surgery.

## 2022-11-15 NOTE — H&P (VIEW-ONLY)
[address] [address]  Phone: [phone]  Primary Provider: Justin Broussard  Pre-op Performing Provider: CAITY VILLARREAL      PREOPERATIVE EVALUATION:  Today's date: 11/15/2022    Aashish Aguilar is a 62 year old male who presents for a preoperative evaluation.    Surgical Information:  Surgery/Procedure: RIGHT TOTAL KNEE ARTHROPLASTY  Surgery Location: [facility]  Surgeon: Hectorynor  Surgery Date: 12/01  Time of Surgery: 0850AM  Where patient plans to recover: At home with family  Fax number for surgical facility: Note does not need to be faxed, will be available electronically in Epic.    Type of Anesthesia Anticipated: Choice    Assessment & Plan     The proposed surgical procedure is considered INTERMEDIATE risk.    Problem List Items Addressed This Visit        Digestive    Morbid obesity (H)   Other Visit Diagnoses     Preop general physical exam    -  Primary    Primary osteoarthritis of right knee            Right knee             Medication Instructions:  Patient is to take all scheduled medications on the day of surgery EXCEPT for modifications listed below:   - aspirin: Discontinue aspirin 7-10 days prior to procedure to reduce bleeding risk. It should be resumed postoperatively.    - Diuretics: HOLD on the day of surgery.   - ibuprofen (Advil, Motrin): HOLD 1 day before surgery.     RECOMMENDATION:  APPROVAL GIVEN to proceed with proposed procedure, without further diagnostic evaluation.              12 minutes spent on the date of the encounter doing chart review, history and exam, documentation and further activities per the note        Subjective     HPI related to upcoming procedure: right knee pain - planning for total knee arthroplasty    Preop Questions 11/14/2022   1. Have you ever had a heart attack or stroke? No   2. Have you ever had surgery on your heart or blood vessels, such as a stent placement, a  coronary artery bypass, or surgery on an artery in your head, neck, heart, or legs? No   3. Do you have chest pain with activity? No   4. Do you have a history of  heart failure? No   5. Do you currently have a cold, bronchitis or symptoms of other infection? No   6. Do you have a cough, shortness of breath, or wheezing? No   7. Do you or anyone in your family have previous history of blood clots? No   8. Do you or does anyone in your family have a serious bleeding problem such as prolonged bleeding following surgeries or cuts? No   9. Have you ever had problems with anemia or been told to take iron pills? No   10. Have you had any abnormal blood loss such as black, tarry or bloody stools? No   11. Have you ever had a blood transfusion? No   12. Are you willing to have a blood transfusion if it is medically needed before, during, or after your surgery? Yes   13. Have you or any of your relatives ever had problems with anesthesia? No   14. Do you have sleep apnea, excessive snoring or daytime drowsiness? No   15. Do you have any artifical heart valves or other implanted medical devices like a pacemaker, defibrillator, or continuous glucose monitor? No   16. Do you have artificial joints? YES - Lt TKA   17. Are you allergic to latex? No       Health Care Directive:  Patient does not have a Health Care Directive or Living Will: Discussed advance care planning with patient; however, patient declined at this time.    Preoperative Review of :   reviewed - controlled substances reflected in medication list.          Review of Systems      Patient Active Problem List    Diagnosis Date Noted     Morbid obesity (H) 06/15/2020     Priority: Medium     Bilateral knee pain 06/15/2020     Priority: Medium     Primary osteoarthritis of both knees 06/15/2020     Priority: Medium     HYPERLIPIDEMIA LDL GOAL <130 10/31/2010     Priority: Medium     Benign neoplasm of colon 06/19/2006     Priority: Medium     HX of Benign Colon  Polyps.       Esophageal reflux 06/01/2006     Priority: Medium     Insomnia 06/01/2006     Priority: Medium     Problem list name updated by automated process. Provider to review       Essential hypertension 03/23/2006     Priority: Medium     Problem list name updated by automated process. Provider to review       Obstructive sleep apnea 03/23/2006     Priority: Medium     Problem list name updated by automated process. Provider to review       Mixed hyperlipidemia 04/16/2003     Priority: Medium      Past Medical History:   Diagnosis Date     History of colonic polyps      Hypertension      Past Surgical History:   Procedure Laterality Date     ARTHROPLASTY KNEE Left 10/17/2022    Procedure: LEFT TOTAL KNEE ARTHROPLASTY;  Surgeon: Kirt Ward MD;  Location:  OR     COLONOSCOPY       COLONOSCOPY N/A 9/8/2022    Procedure: COLONOSCOPY, FLEXIBLE, WITH LESION REMOVAL USING SNARE;  Surgeon: Merna Méndez MD;  Location:  GI     [facility] NONSPECIFIC PROCEDURE  01/01/1991    Left knee arthroscopy -- Abstracted 5/30/02     [facility] NONSPECIFIC PROCEDURE  04/12/2002    Right knee arthroscopy -- Abstracted 5/30/02     Current Outpatient Medications   Medication Sig Dispense Refill     acetaminophen (TYLENOL) 325 MG tablet Take 2 tablets (650 mg) by mouth every 4 hours as needed for other (mild pain) 100 tablet 0     aspirin (ASA) 325 MG EC tablet Take 1 tablet (325 mg) by mouth 2 times daily (with meals) Take twice a day for 5 days and then decrease to once daily as long as there are no GI issues 30 tablet 0     diphenhydrAMINE-acetaminophen (TYLENOL PM)  MG tablet Take 1 tablet by mouth nightly as needed for sleep       hydrOXYzine (ATARAX) 25 MG tablet Take 1 tablet (25 mg) by mouth every 6 hours as needed for itching or anxiety (with pain, moderate pain) 30 tablet 0     ibuprofen (ADVIL/MOTRIN) 200 MG capsule Take 200-400 mg by mouth every 4 hours as needed for fever       ibuprofen (ADVIL/MOTRIN) 600 MG  "tablet Take 1 tablet (600 mg) by mouth every 6 hours as needed (mild) 30 tablet 0     lisinopril-hydrochlorothiazide (ZESTORETIC) 20-12.5 MG tablet Take 1 tablet by mouth daily 90 tablet 1     oxyCODONE (ROXICODONE) 5 MG tablet Take 1-2 tablets (5-10 mg) by mouth every 4 hours as needed for moderate to severe pain 33 tablet 0     pravastatin (PRAVACHOL) 40 MG tablet Take 1 tablet (40 mg) by mouth daily 90 tablet 0     senna-docusate (SENOKOT-S/PERICOLACE) 8.6-50 MG tablet Take 1-2 tablets by mouth 2 times daily Take while on oral narcotics to prevent or treat constipation. 30 tablet 0     traZODone (DESYREL) 50 MG tablet TAKE 1 TABLET(50 MG) BY MOUTH AT BEDTIME 30 tablet 1       Allergies   Allergen Reactions     No Known Drug Allergies         Social History     Tobacco Use     Smoking status: Former     Types: Cigarettes     Quit date: 2000     Years since quittin.5     Smokeless tobacco: Never   Substance Use Topics     Alcohol use: Yes     Comment: 1drink a day       History   Drug Use No         Objective     /83   Pulse 87   Temp 98.1  F (36.7  C) (Temporal)   Resp 16   Ht 1.753 m (5' 9\")   Wt 97.1 kg (214 lb)   SpO2 99%   BMI 31.60 kg/m      Physical Exam  Constitutional:       General: He is not in acute distress.     Appearance: He is well-developed. He is not diaphoretic.   HENT:      Head: Normocephalic.      Right Ear: External ear normal.      Left Ear: External ear normal.      Nose: Nose normal.   Eyes:      Conjunctiva/sclera: Conjunctivae normal.   Cardiovascular:      Rate and Rhythm: Normal rate and regular rhythm.      Heart sounds: Normal heart sounds.   Pulmonary:      Effort: Pulmonary effort is normal.      Breath sounds: Normal breath sounds.   Musculoskeletal:      Cervical back: Normal range of motion.   Skin:     General: Skin is warm and dry.   Neurological:      Mental Status: He is alert and oriented to person, place, and time.   Psychiatric:         Judgment: " Judgment normal.           Recent Labs   Lab Test 10/18/22  0724 10/17/22  1156 10/17/22  0619 08/31/22  1319 04/12/22  1600   HGB 12.0*  --  14.2 15.1 15.1   PLT  --   --  314 270 315   NA  --  135  --  134 137   POTASSIUM  --  4.4 3.6 3.9 4.1   CR  --  0.96  --  1.03 0.99   A1C  --   --   --  5.4 5.7*        Diagnostics:  Labs pending at this time.  Results will be reviewed when available.   No EKG required, no history of coronary heart disease, significant arrhythmia, peripheral arterial disease or other structural heart disease.    Revised Cardiac Risk Index (RCRI):  The patient has the following serious cardiovascular risks for perioperative complications:   - No serious cardiac risks = 0 points     RCRI Interpretation: 0 points: Class I (very low risk - 0.4% complication rate)           Signed Electronically by: Isi Joiner PA-C  Copy of this evaluation report is provided to requesting physician.

## 2022-11-15 NOTE — PROGRESS NOTES
[address] [address]  Phone: [phone]  Primary Provider: Justin Broussard  Pre-op Performing Provider: CAITY VILLARREAL      PREOPERATIVE EVALUATION:  Today's date: 11/15/2022    Aashish Aguilar is a 62 year old male who presents for a preoperative evaluation.    Surgical Information:  Surgery/Procedure: RIGHT TOTAL KNEE ARTHROPLASTY  Surgery Location: [facility]  Surgeon: Hectorynor  Surgery Date: 12/01  Time of Surgery: 0850AM  Where patient plans to recover: At home with family  Fax number for surgical facility: Note does not need to be faxed, will be available electronically in Epic.    Type of Anesthesia Anticipated: Choice    Assessment & Plan     The proposed surgical procedure is considered INTERMEDIATE risk.    Problem List Items Addressed This Visit        Digestive    Morbid obesity (H)   Other Visit Diagnoses     Preop general physical exam    -  Primary    Primary osteoarthritis of right knee            Right knee             Medication Instructions:  Patient is to take all scheduled medications on the day of surgery EXCEPT for modifications listed below:   - aspirin: Discontinue aspirin 7-10 days prior to procedure to reduce bleeding risk. It should be resumed postoperatively.    - Diuretics: HOLD on the day of surgery.   - ibuprofen (Advil, Motrin): HOLD 1 day before surgery.     RECOMMENDATION:  APPROVAL GIVEN to proceed with proposed procedure, without further diagnostic evaluation.              12 minutes spent on the date of the encounter doing chart review, history and exam, documentation and further activities per the note        Subjective     HPI related to upcoming procedure: right knee pain - planning for total knee arthroplasty    Preop Questions 11/14/2022   1. Have you ever had a heart attack or stroke? No   2. Have you ever had surgery on your heart or blood vessels, such as a stent placement, a  coronary artery bypass, or surgery on an artery in your head, neck, heart, or legs? No   3. Do you have chest pain with activity? No   4. Do you have a history of  heart failure? No   5. Do you currently have a cold, bronchitis or symptoms of other infection? No   6. Do you have a cough, shortness of breath, or wheezing? No   7. Do you or anyone in your family have previous history of blood clots? No   8. Do you or does anyone in your family have a serious bleeding problem such as prolonged bleeding following surgeries or cuts? No   9. Have you ever had problems with anemia or been told to take iron pills? No   10. Have you had any abnormal blood loss such as black, tarry or bloody stools? No   11. Have you ever had a blood transfusion? No   12. Are you willing to have a blood transfusion if it is medically needed before, during, or after your surgery? Yes   13. Have you or any of your relatives ever had problems with anesthesia? No   14. Do you have sleep apnea, excessive snoring or daytime drowsiness? No   15. Do you have any artifical heart valves or other implanted medical devices like a pacemaker, defibrillator, or continuous glucose monitor? No   16. Do you have artificial joints? YES - Lt TKA   17. Are you allergic to latex? No       Health Care Directive:  Patient does not have a Health Care Directive or Living Will: Discussed advance care planning with patient; however, patient declined at this time.    Preoperative Review of :   reviewed - controlled substances reflected in medication list.          Review of Systems      Patient Active Problem List    Diagnosis Date Noted     Morbid obesity (H) 06/15/2020     Priority: Medium     Bilateral knee pain 06/15/2020     Priority: Medium     Primary osteoarthritis of both knees 06/15/2020     Priority: Medium     HYPERLIPIDEMIA LDL GOAL <130 10/31/2010     Priority: Medium     Benign neoplasm of colon 06/19/2006     Priority: Medium     HX of Benign Colon  Polyps.       Esophageal reflux 06/01/2006     Priority: Medium     Insomnia 06/01/2006     Priority: Medium     Problem list name updated by automated process. Provider to review       Essential hypertension 03/23/2006     Priority: Medium     Problem list name updated by automated process. Provider to review       Obstructive sleep apnea 03/23/2006     Priority: Medium     Problem list name updated by automated process. Provider to review       Mixed hyperlipidemia 04/16/2003     Priority: Medium      Past Medical History:   Diagnosis Date     History of colonic polyps      Hypertension      Past Surgical History:   Procedure Laterality Date     ARTHROPLASTY KNEE Left 10/17/2022    Procedure: LEFT TOTAL KNEE ARTHROPLASTY;  Surgeon: Kirt Ward MD;  Location:  OR     COLONOSCOPY       COLONOSCOPY N/A 9/8/2022    Procedure: COLONOSCOPY, FLEXIBLE, WITH LESION REMOVAL USING SNARE;  Surgeon: Merna Méndez MD;  Location:  GI     [facility]SPEC[facility]ED[facility]  01/01/1991    Left knee arthroscopy -- Abstracted 5/30/02     [facility] NONSPECIFIC PROCEDURE  04/12/2002    Right knee arthroscopy -- Abstracted 5/30/02     Current Outpatient Medications   Medication Sig Dispense Refill     acetaminophen (TYLENOL) 325 MG tablet Take 2 tablets (650 mg) by mouth every 4 hours as needed for other (mild pain) 100 tablet 0     aspirin (ASA) 325 MG EC tablet Take 1 tablet (325 mg) by mouth 2 times daily (with meals) Take twice a day for 5 days and then decrease to once daily as long as there are no GI issues 30 tablet 0     diphenhydrAMINE-acetaminophen (TYLENOL PM)  MG tablet Take 1 tablet by mouth nightly as needed for sleep       hydrOXYzine (ATARAX) 25 MG tablet Take 1 tablet (25 mg) by mouth every 6 hours as needed for itching or anxiety (with pain, moderate pain) 30 tablet 0     ibuprofen (ADVIL/MOTRIN) 200 MG capsule Take 200-400 mg by mouth every 4 hours as needed for fever       ibuprofen (ADVIL/MOTRIN) 600 MG  "tablet Take 1 tablet (600 mg) by mouth every 6 hours as needed (mild) 30 tablet 0     lisinopril-hydrochlorothiazide (ZESTORETIC) 20-12.5 MG tablet Take 1 tablet by mouth daily 90 tablet 1     oxyCODONE (ROXICODONE) 5 MG tablet Take 1-2 tablets (5-10 mg) by mouth every 4 hours as needed for moderate to severe pain 33 tablet 0     pravastatin (PRAVACHOL) 40 MG tablet Take 1 tablet (40 mg) by mouth daily 90 tablet 0     senna-docusate (SENOKOT-S/PERICOLACE) 8.6-50 MG tablet Take 1-2 tablets by mouth 2 times daily Take while on oral narcotics to prevent or treat constipation. 30 tablet 0     traZODone (DESYREL) 50 MG tablet TAKE 1 TABLET(50 MG) BY MOUTH AT BEDTIME 30 tablet 1       Allergies   Allergen Reactions     No Known Drug Allergies         Social History     Tobacco Use     Smoking status: Former     Types: Cigarettes     Quit date: 2000     Years since quittin.5     Smokeless tobacco: Never   Substance Use Topics     Alcohol use: Yes     Comment: 1drink a day       History   Drug Use No         Objective     /83   Pulse 87   Temp 98.1  F (36.7  C) (Temporal)   Resp 16   Ht 1.753 m (5' 9\")   Wt 97.1 kg (214 lb)   SpO2 99%   BMI 31.60 kg/m      Physical Exam  Constitutional:       General: He is not in acute distress.     Appearance: He is well-developed. He is not diaphoretic.   HENT:      Head: Normocephalic.      Right Ear: External ear normal.      Left Ear: External ear normal.      Nose: Nose normal.   Eyes:      Conjunctiva/sclera: Conjunctivae normal.   Cardiovascular:      Rate and Rhythm: Normal rate and regular rhythm.      Heart sounds: Normal heart sounds.   Pulmonary:      Effort: Pulmonary effort is normal.      Breath sounds: Normal breath sounds.   Musculoskeletal:      Cervical back: Normal range of motion.   Skin:     General: Skin is warm and dry.   Neurological:      Mental Status: He is alert and oriented to person, place, and time.   Psychiatric:         Judgment: " Judgment normal.           Recent Labs   Lab Test 10/18/22  0724 10/17/22  1156 10/17/22  0619 08/31/22  1319 04/12/22  1600   HGB 12.0*  --  14.2 15.1 15.1   PLT  --   --  314 270 315   NA  --  135  --  134 137   POTASSIUM  --  4.4 3.6 3.9 4.1   CR  --  0.96  --  1.03 0.99   A1C  --   --   --  5.4 5.7*        Diagnostics:  Labs pending at this time.  Results will be reviewed when available.   No EKG required, no history of coronary heart disease, significant arrhythmia, peripheral arterial disease or other structural heart disease.    Revised Cardiac Risk Index (RCRI):  The patient has the following serious cardiovascular risks for perioperative complications:   - No serious cardiac risks = 0 points     RCRI Interpretation: 0 points: Class I (very low risk - 0.4% complication rate)           Signed Electronically by: Isi Joiner PA-C  Copy of this evaluation report is provided to requesting physician.

## 2022-11-20 NOTE — RESULT ENCOUNTER NOTE
Aashish    Your lab tests are complete and I have reviewed the results.     - Hemoglobin is still a little low, however it is not too low for surgery.    I suggest you take iron for the next 3 months:    To supplement iron:  - Take 325 mg of ferrous sulfate every other day.  This is available over the counter.   - Make sure to take ferrous sulfate with orange juice or vitamin C supplements.  Vitamin C helps your body absorb the iron.    - DO NOT take ferrous sulfate within 3 hours of consuming calcium containing foods or supplements.  Make sure your orange juice is not fortified with calcium.   Calcium will prevent iron absorption.   - DO NOT take ferrous sulfate within 12 hours of gastric reflux medications - these include omeprazole, pantoprazole, ranitidine, famotidine or other proton pump inhibitors or H2 blocker medications (make sure to read labels if you take medications for gastric reflux).  These medications also prevent iron absorption.   - It is ok to take ferrous sulfate with food.  - The most common side effects of ferrous sulfate are constipation and upset stomach.         If you have any questions or concerns, please feel free to call or send a [employer] message.    Sincerely,  Sterling Joiner PA-C

## 2022-12-09 NOTE — PROGRESS NOTES
PTA medications updated by Medication Scribe prior to surgery via phone call with patient (last doses completed by Nurse)     Medication history sources: Patient, Surescripts and H&P  In the past week, patient estimated taking medication this percent of the time: Greater than 90%  Adherence assessment: N/A Not Observed    Significant changes made to the medication list:  Patient reports no longer taking the following meds (med scribe removed from PTA med list): Aspirin, Hydroxyzine      Additional medication history information:   None    Medication reconciliation completed by provider prior to medication history? No    Time spent in this activity: 30 minutes    The information provided in this note is only as accurate as the sources available at the time of update(s)    Prior to Admission medications    Medication Sig Last Dose Taking? Auth Provider Long Term End Date   acetaminophen (TYLENOL) 325 MG tablet Take 2 tablets (650 mg) by mouth every 4 hours as needed for other (mild pain)  at prn Yes Altagracia Hernandez PA-C     diphenhydrAMINE-acetaminophen (TYLENOL PM)  MG tablet Take 1 tablet by mouth nightly as needed for sleep  at prn Yes Reported, Patient     ibuprofen (ADVIL/MOTRIN) 200 MG capsule Take 200-400 mg by mouth every 4 hours as needed for fever  at prn Yes Reported, Patient     lisinopril-hydrochlorothiazide (ZESTORETIC) 20-12.5 MG tablet Take 1 tablet by mouth daily  at am Yes Justin Broussard MD Yes    oxyCODONE (ROXICODONE) 5 MG tablet Take 1-2 tablets (5-10 mg) by mouth every 4 hours as needed for moderate to severe pain  at prn Yes Altagracia Hernandez PA-C     pravastatin (PRAVACHOL) 40 MG tablet Take 1 tablet (40 mg) by mouth daily  at am Yes Justin Broussard MD Yes    senna-docusate (SENOKOT-S/PERICOLACE) 8.6-50 MG tablet Take 1-2 tablets by mouth 2 times daily Take while on oral narcotics to prevent or treat constipation.  at prn Yes Altagracia Hernandez PA-C     traZODone (DESYREL) 50 MG tablet TAKE 1  TABLET(50 MG) BY MOUTH AT BEDTIME  at pm Yes Justin Broussard MD Yes      Medication history completed by:    Brian Vickers CPhT  Medication [facility]

## 2022-12-12 ENCOUNTER — ANESTHESIA (OUTPATIENT)
Dept: SURGERY | Facility: CLINIC | Age: 62
End: 2022-12-12
Payer: COMMERCIAL

## 2022-12-12 ENCOUNTER — ANESTHESIA EVENT (OUTPATIENT)
Dept: SURGERY | Facility: CLINIC | Age: 62
End: 2022-12-12
Payer: COMMERCIAL

## 2022-12-12 ENCOUNTER — APPOINTMENT (OUTPATIENT)
Dept: GENERAL RADIOLOGY | Facility: CLINIC | Age: 62
End: 2022-12-12
Attending: ORTHOPAEDIC SURGERY
Payer: COMMERCIAL

## 2022-12-12 ENCOUNTER — APPOINTMENT (OUTPATIENT)
Dept: PHYSICAL THERAPY | Facility: CLINIC | Age: 62
End: 2022-12-12
Attending: ORTHOPAEDIC SURGERY
Payer: COMMERCIAL

## 2022-12-12 ENCOUNTER — HOSPITAL ENCOUNTER (OUTPATIENT)
Facility: CLINIC | Age: 62
Discharge: HOME | End: 2022-12-13
Attending: ORTHOPAEDIC SURGERY | Admitting: ORTHOPAEDIC SURGERY
Payer: COMMERCIAL

## 2022-12-12 DIAGNOSIS — M17.0 PRIMARY OSTEOARTHRITIS OF BOTH KNEES: ICD-10-CM

## 2022-12-12 DIAGNOSIS — I10 BENIGN ESSENTIAL HYPERTENSION: ICD-10-CM

## 2022-12-12 DIAGNOSIS — Z96.651 TOTAL KNEE REPLACEMENT STATUS, RIGHT: Primary | ICD-10-CM

## 2022-12-12 LAB
ANION GAP SERPL CALCULATED.3IONS-SCNC: 5 MMOL/L (ref 3–14)
BUN SERPL-MCNC: 13 MG/DL (ref 7–30)
CALCIUM SERPL-MCNC: 9.1 MG/DL (ref 8.5–10.1)
CHLORIDE BLD-SCNC: 106 MMOL/L (ref 94–109)
CO2 SERPL-SCNC: 27 MMOL/L (ref 20–32)
CREAT SERPL-MCNC: 0.93 MG/DL (ref 0.66–1.25)
CREAT SERPL-MCNC: 0.96 MG/DL (ref 0.66–1.25)
GFR SERPL CREATININE-BSD FRML MDRD: 89 ML/MIN/1.73M2
GFR SERPL CREATININE-BSD FRML MDRD: >90 ML/MIN/1.73M2
GLUCOSE BLD-MCNC: 130 MG/DL (ref 70–99)
GLUCOSE BLDC GLUCOMTR-MCNC: 162 MG/DL (ref 70–99)
PLATELET # BLD AUTO: 323 10E3/UL (ref 150–450)
POTASSIUM BLD-SCNC: 3.9 MMOL/L (ref 3.4–5.3)
POTASSIUM BLD-SCNC: 4.3 MMOL/L (ref 3.4–5.3)
SODIUM SERPL-SCNC: 138 MMOL/L (ref 133–144)

## 2022-12-12 PROCEDURE — 258N000003 HC RX IP 258 OP 636: Performed by: ANESTHESIOLOGY

## 2022-12-12 PROCEDURE — 250N000011 HC RX IP 250 OP 636: Performed by: ORTHOPAEDIC SURGERY

## 2022-12-12 PROCEDURE — 999N000065 XR KNEE PORT RIGHT 1/2 VIEWS: Mod: RT

## 2022-12-12 PROCEDURE — 36415 COLL VENOUS BLD VENIPUNCTURE: CPT | Performed by: ANESTHESIOLOGY

## 2022-12-12 PROCEDURE — 250N000011 HC RX IP 250 OP 636: Performed by: ANESTHESIOLOGY

## 2022-12-12 PROCEDURE — 85049 AUTOMATED PLATELET COUNT: CPT | Performed by: ANESTHESIOLOGY

## 2022-12-12 PROCEDURE — 360N000077 HC SURGERY LEVEL 4, PER MIN: Performed by: ORTHOPAEDIC SURGERY

## 2022-12-12 PROCEDURE — 84132 ASSAY OF SERUM POTASSIUM: CPT | Performed by: ANESTHESIOLOGY

## 2022-12-12 PROCEDURE — 36415 COLL VENOUS BLD VENIPUNCTURE: CPT | Performed by: ORTHOPAEDIC SURGERY

## 2022-12-12 PROCEDURE — 258N000003 HC RX IP 258 OP 636: Performed by: ORTHOPAEDIC SURGERY

## 2022-12-12 PROCEDURE — 999N000141 HC STATISTIC PRE-PROCEDURE NURSING ASSESSMENT: Performed by: ORTHOPAEDIC SURGERY

## 2022-12-12 PROCEDURE — 97161 PT EVAL LOW COMPLEX 20 MIN: CPT | Mod: GP

## 2022-12-12 PROCEDURE — C1776 JOINT DEVICE (IMPLANTABLE): HCPCS | Performed by: ORTHOPAEDIC SURGERY

## 2022-12-12 PROCEDURE — 250N000009 HC RX 250

## 2022-12-12 PROCEDURE — 250N000011 HC RX IP 250 OP 636

## 2022-12-12 PROCEDURE — 370N000017 HC ANESTHESIA TECHNICAL FEE, PER MIN: Performed by: ORTHOPAEDIC SURGERY

## 2022-12-12 PROCEDURE — 272N000001 HC OR GENERAL SUPPLY STERILE: Performed by: ORTHOPAEDIC SURGERY

## 2022-12-12 PROCEDURE — 82565 ASSAY OF CREATININE: CPT | Performed by: ANESTHESIOLOGY

## 2022-12-12 PROCEDURE — 82962 GLUCOSE BLOOD TEST: CPT

## 2022-12-12 PROCEDURE — C1713 ANCHOR/SCREW BN/BN,TIS/BN: HCPCS | Performed by: ORTHOPAEDIC SURGERY

## 2022-12-12 PROCEDURE — 250N000009 HC RX 250: Performed by: ORTHOPAEDIC SURGERY

## 2022-12-12 PROCEDURE — 97116 GAIT TRAINING THERAPY: CPT | Mod: GP

## 2022-12-12 PROCEDURE — 84132 ASSAY OF SERUM POTASSIUM: CPT | Performed by: ORTHOPAEDIC SURGERY

## 2022-12-12 PROCEDURE — 250N000013 HC RX MED GY IP 250 OP 250 PS 637: Performed by: ORTHOPAEDIC SURGERY

## 2022-12-12 PROCEDURE — 97530 THERAPEUTIC ACTIVITIES: CPT | Mod: GP

## 2022-12-12 PROCEDURE — 710N000009 HC RECOVERY PHASE 1, LEVEL 1, PER MIN: Performed by: ORTHOPAEDIC SURGERY

## 2022-12-12 DEVICE — BONE CEMENT RADIOPAQUE SIMPLEX HV FULL DOSE 6194-1-001: Type: IMPLANTABLE DEVICE | Site: KNEE | Status: FUNCTIONAL

## 2022-12-12 DEVICE — IMP INSERT TIBIAL BIOM PS PLUS BEARING 14X79/83MM 183764: Type: IMPLANTABLE DEVICE | Site: KNEE | Status: FUNCTIONAL

## 2022-12-12 DEVICE — IMP COMP FEMORAL VANGARD BIOM FIXATION 183099: Type: IMPLANTABLE DEVICE | Site: KNEE | Status: FUNCTIONAL

## 2022-12-12 DEVICE — IMP COMP PATELLA BIOM 3 PEG 37MM STD 184768: Type: IMPLANTABLE DEVICE | Site: KNEE | Status: FUNCTIONAL

## 2022-12-12 DEVICE — IMPLANTABLE DEVICE: Type: IMPLANTABLE DEVICE | Site: KNEE | Status: FUNCTIONAL

## 2022-12-12 DEVICE — IMP COMP TIBIAL KNEE ASCENT 79MM 141225: Type: IMPLANTABLE DEVICE | Site: KNEE | Status: FUNCTIONAL

## 2022-12-12 RX ORDER — ONDANSETRON 2 MG/ML
INJECTION INTRAMUSCULAR; INTRAVENOUS PRN
Status: DISCONTINUED | OUTPATIENT
Start: 2022-12-12 | End: 2022-12-12

## 2022-12-12 RX ORDER — LIDOCAINE 40 MG/G
CREAM TOPICAL
Status: DISCONTINUED | OUTPATIENT
Start: 2022-12-12 | End: 2022-12-13 | Stop reason: HOSPADM

## 2022-12-12 RX ORDER — SODIUM CHLORIDE, SODIUM LACTATE, POTASSIUM CHLORIDE, CALCIUM CHLORIDE 600; 310; 30; 20 MG/100ML; MG/100ML; MG/100ML; MG/100ML
INJECTION, SOLUTION INTRAVENOUS CONTINUOUS
Status: DISCONTINUED | OUTPATIENT
Start: 2022-12-12 | End: 2022-12-12 | Stop reason: ALTCHOICE

## 2022-12-12 RX ORDER — FENTANYL CITRATE 50 UG/ML
25 INJECTION, SOLUTION INTRAMUSCULAR; INTRAVENOUS EVERY 5 MIN PRN
Status: DISCONTINUED | OUTPATIENT
Start: 2022-12-12 | End: 2022-12-12 | Stop reason: HOSPADM

## 2022-12-12 RX ORDER — VANCOMYCIN HYDROCHLORIDE 1 G/20ML
INJECTION, POWDER, LYOPHILIZED, FOR SOLUTION INTRAVENOUS PRN
Status: DISCONTINUED | OUTPATIENT
Start: 2022-12-12 | End: 2022-12-12 | Stop reason: HOSPADM

## 2022-12-12 RX ORDER — PROCHLORPERAZINE MALEATE 10 MG
10 TABLET ORAL EVERY 6 HOURS PRN
Status: DISCONTINUED | OUTPATIENT
Start: 2022-12-12 | End: 2022-12-13 | Stop reason: HOSPADM

## 2022-12-12 RX ORDER — AMOXICILLIN 250 MG
1-2 CAPSULE ORAL 2 TIMES DAILY
Qty: 30 TABLET | Refills: 0 | Status: SHIPPED | OUTPATIENT
Start: 2022-12-12

## 2022-12-12 RX ORDER — NALOXONE HYDROCHLORIDE 0.4 MG/ML
0.4 INJECTION, SOLUTION INTRAMUSCULAR; INTRAVENOUS; SUBCUTANEOUS
Status: DISCONTINUED | OUTPATIENT
Start: 2022-12-12 | End: 2022-12-13 | Stop reason: HOSPADM

## 2022-12-12 RX ORDER — OXYCODONE HYDROCHLORIDE 5 MG/1
5-10 TABLET ORAL EVERY 6 HOURS PRN
Qty: 33 TABLET | Refills: 0 | Status: SHIPPED | OUTPATIENT
Start: 2022-12-12 | End: 2022-12-13

## 2022-12-12 RX ORDER — LABETALOL HYDROCHLORIDE 5 MG/ML
10 INJECTION, SOLUTION INTRAVENOUS
Status: DISCONTINUED | OUTPATIENT
Start: 2022-12-12 | End: 2022-12-12 | Stop reason: HOSPADM

## 2022-12-12 RX ORDER — TRANEXAMIC ACID 10 MG/ML
1 INJECTION, SOLUTION INTRAVENOUS ONCE
Status: COMPLETED | OUTPATIENT
Start: 2022-12-12 | End: 2022-12-12

## 2022-12-12 RX ORDER — NALOXONE HYDROCHLORIDE 0.4 MG/ML
0.2 INJECTION, SOLUTION INTRAMUSCULAR; INTRAVENOUS; SUBCUTANEOUS
Status: DISCONTINUED | OUTPATIENT
Start: 2022-12-12 | End: 2022-12-13 | Stop reason: HOSPADM

## 2022-12-12 RX ORDER — BUPIVACAINE HYDROCHLORIDE 7.5 MG/ML
INJECTION, SOLUTION INTRASPINAL PRN
Status: DISCONTINUED | OUTPATIENT
Start: 2022-12-12 | End: 2022-12-12

## 2022-12-12 RX ORDER — ACETAMINOPHEN 325 MG/1
975 TABLET ORAL EVERY 8 HOURS
Status: DISCONTINUED | OUTPATIENT
Start: 2022-12-12 | End: 2022-12-13 | Stop reason: HOSPADM

## 2022-12-12 RX ORDER — CEFAZOLIN SODIUM/WATER 2 G/20 ML
2 SYRINGE (ML) INTRAVENOUS
Status: DISCONTINUED | OUTPATIENT
Start: 2022-12-12 | End: 2022-12-12

## 2022-12-12 RX ORDER — CEFAZOLIN SODIUM 2 G/100ML
2 INJECTION, SOLUTION INTRAVENOUS EVERY 8 HOURS
Status: COMPLETED | OUTPATIENT
Start: 2022-12-12 | End: 2022-12-13

## 2022-12-12 RX ORDER — CEFAZOLIN SODIUM/WATER 2 G/20 ML
2 SYRINGE (ML) INTRAVENOUS SEE ADMIN INSTRUCTIONS
Status: DISCONTINUED | OUTPATIENT
Start: 2022-12-12 | End: 2022-12-12

## 2022-12-12 RX ORDER — HYDROMORPHONE HCL IN WATER/PF 6 MG/30 ML
0.4 PATIENT CONTROLLED ANALGESIA SYRINGE INTRAVENOUS EVERY 5 MIN PRN
Status: DISCONTINUED | OUTPATIENT
Start: 2022-12-12 | End: 2022-12-12 | Stop reason: HOSPADM

## 2022-12-12 RX ORDER — BISACODYL 10 MG
10 SUPPOSITORY, RECTAL RECTAL DAILY PRN
Status: DISCONTINUED | OUTPATIENT
Start: 2022-12-12 | End: 2022-12-13 | Stop reason: HOSPADM

## 2022-12-12 RX ORDER — SODIUM CHLORIDE, SODIUM LACTATE, POTASSIUM CHLORIDE, CALCIUM CHLORIDE 600; 310; 30; 20 MG/100ML; MG/100ML; MG/100ML; MG/100ML
INJECTION, SOLUTION INTRAVENOUS CONTINUOUS
Status: DISCONTINUED | OUTPATIENT
Start: 2022-12-12 | End: 2022-12-13 | Stop reason: HOSPADM

## 2022-12-12 RX ORDER — AMOXICILLIN 250 MG
1 CAPSULE ORAL 2 TIMES DAILY
Status: DISCONTINUED | OUTPATIENT
Start: 2022-12-12 | End: 2022-12-13 | Stop reason: HOSPADM

## 2022-12-12 RX ORDER — DEXAMETHASONE SODIUM PHOSPHATE 4 MG/ML
INJECTION, SOLUTION INTRA-ARTICULAR; INTRALESIONAL; INTRAMUSCULAR; INTRAVENOUS; SOFT TISSUE PRN
Status: DISCONTINUED | OUTPATIENT
Start: 2022-12-12 | End: 2022-12-12

## 2022-12-12 RX ORDER — OXYCODONE HYDROCHLORIDE 5 MG/1
5 TABLET ORAL EVERY 4 HOURS PRN
Status: DISCONTINUED | OUTPATIENT
Start: 2022-12-12 | End: 2022-12-13 | Stop reason: HOSPADM

## 2022-12-12 RX ORDER — SODIUM CHLORIDE 9 MG/ML
INJECTION, SOLUTION INTRAVENOUS CONTINUOUS
Status: DISCONTINUED | OUTPATIENT
Start: 2022-12-12 | End: 2022-12-13 | Stop reason: HOSPADM

## 2022-12-12 RX ORDER — ONDANSETRON 2 MG/ML
4 INJECTION INTRAMUSCULAR; INTRAVENOUS EVERY 6 HOURS PRN
Status: DISCONTINUED | OUTPATIENT
Start: 2022-12-12 | End: 2022-12-13 | Stop reason: HOSPADM

## 2022-12-12 RX ORDER — IBUPROFEN 600 MG/1
600 TABLET, FILM COATED ORAL EVERY 6 HOURS PRN
Status: DISCONTINUED | OUTPATIENT
Start: 2022-12-12 | End: 2022-12-13 | Stop reason: HOSPADM

## 2022-12-12 RX ORDER — FENTANYL CITRATE 50 UG/ML
50 INJECTION, SOLUTION INTRAMUSCULAR; INTRAVENOUS EVERY 5 MIN PRN
Status: DISCONTINUED | OUTPATIENT
Start: 2022-12-12 | End: 2022-12-12 | Stop reason: HOSPADM

## 2022-12-12 RX ORDER — AMOXICILLIN 250 MG
1-2 CAPSULE ORAL 2 TIMES DAILY
Status: DISCONTINUED | OUTPATIENT
Start: 2022-12-12 | End: 2022-12-12 | Stop reason: ALTCHOICE

## 2022-12-12 RX ORDER — POLYETHYLENE GLYCOL 3350 17 G/17G
17 POWDER, FOR SOLUTION ORAL DAILY
Status: DISCONTINUED | OUTPATIENT
Start: 2022-12-13 | End: 2022-12-13 | Stop reason: HOSPADM

## 2022-12-12 RX ORDER — HYDROMORPHONE HCL IN WATER/PF 6 MG/30 ML
0.4 PATIENT CONTROLLED ANALGESIA SYRINGE INTRAVENOUS
Status: DISCONTINUED | OUTPATIENT
Start: 2022-12-12 | End: 2022-12-13 | Stop reason: HOSPADM

## 2022-12-12 RX ORDER — ACETAMINOPHEN 325 MG/1
650 TABLET ORAL EVERY 4 HOURS PRN
Qty: 100 TABLET | Refills: 0 | Status: SHIPPED | OUTPATIENT
Start: 2022-12-12 | End: 2022-12-13

## 2022-12-12 RX ORDER — PROPOFOL 10 MG/ML
INJECTION, EMULSION INTRAVENOUS CONTINUOUS PRN
Status: DISCONTINUED | OUTPATIENT
Start: 2022-12-12 | End: 2022-12-12

## 2022-12-12 RX ORDER — IBUPROFEN 600 MG/1
600 TABLET, FILM COATED ORAL EVERY 6 HOURS PRN
Qty: 30 TABLET | Refills: 0 | Status: SHIPPED | OUTPATIENT
Start: 2022-12-12

## 2022-12-12 RX ORDER — ONDANSETRON 4 MG/1
4 TABLET, ORALLY DISINTEGRATING ORAL EVERY 30 MIN PRN
Status: DISCONTINUED | OUTPATIENT
Start: 2022-12-12 | End: 2022-12-12 | Stop reason: HOSPADM

## 2022-12-12 RX ORDER — LISINOPRIL AND HYDROCHLOROTHIAZIDE 12.5; 2 MG/1; MG/1
1 TABLET ORAL DAILY
Status: DISCONTINUED | OUTPATIENT
Start: 2022-12-13 | End: 2022-12-13 | Stop reason: HOSPADM

## 2022-12-12 RX ORDER — LIDOCAINE HYDROCHLORIDE 20 MG/ML
INJECTION, SOLUTION INFILTRATION; PERINEURAL PRN
Status: DISCONTINUED | OUTPATIENT
Start: 2022-12-12 | End: 2022-12-12

## 2022-12-12 RX ORDER — FERROUS SULFATE 325(65) MG
325 TABLET ORAL
COMMUNITY

## 2022-12-12 RX ORDER — ONDANSETRON 2 MG/ML
4 INJECTION INTRAMUSCULAR; INTRAVENOUS EVERY 30 MIN PRN
Status: DISCONTINUED | OUTPATIENT
Start: 2022-12-12 | End: 2022-12-12 | Stop reason: HOSPADM

## 2022-12-12 RX ORDER — ACETAMINOPHEN 325 MG/1
650 TABLET ORAL EVERY 4 HOURS PRN
Status: DISCONTINUED | OUTPATIENT
Start: 2022-12-15 | End: 2022-12-13 | Stop reason: HOSPADM

## 2022-12-12 RX ORDER — ACETAMINOPHEN 325 MG/1
650 TABLET ORAL EVERY 4 HOURS PRN
Status: DISCONTINUED | OUTPATIENT
Start: 2022-12-12 | End: 2022-12-12 | Stop reason: ALTCHOICE

## 2022-12-12 RX ORDER — PROPOFOL 10 MG/ML
INJECTION, EMULSION INTRAVENOUS PRN
Status: DISCONTINUED | OUTPATIENT
Start: 2022-12-12 | End: 2022-12-12

## 2022-12-12 RX ORDER — SODIUM CHLORIDE, SODIUM LACTATE, POTASSIUM CHLORIDE, CALCIUM CHLORIDE 600; 310; 30; 20 MG/100ML; MG/100ML; MG/100ML; MG/100ML
INJECTION, SOLUTION INTRAVENOUS CONTINUOUS
Status: DISCONTINUED | OUTPATIENT
Start: 2022-12-12 | End: 2022-12-12 | Stop reason: HOSPADM

## 2022-12-12 RX ORDER — ONDANSETRON 4 MG/1
4 TABLET, ORALLY DISINTEGRATING ORAL EVERY 6 HOURS PRN
Status: DISCONTINUED | OUTPATIENT
Start: 2022-12-12 | End: 2022-12-13 | Stop reason: HOSPADM

## 2022-12-12 RX ORDER — HYDROMORPHONE HCL IN WATER/PF 6 MG/30 ML
0.2 PATIENT CONTROLLED ANALGESIA SYRINGE INTRAVENOUS
Status: DISCONTINUED | OUTPATIENT
Start: 2022-12-12 | End: 2022-12-13 | Stop reason: HOSPADM

## 2022-12-12 RX ORDER — HYDROMORPHONE HCL IN WATER/PF 6 MG/30 ML
0.2 PATIENT CONTROLLED ANALGESIA SYRINGE INTRAVENOUS EVERY 5 MIN PRN
Status: DISCONTINUED | OUTPATIENT
Start: 2022-12-12 | End: 2022-12-12 | Stop reason: HOSPADM

## 2022-12-12 RX ORDER — HYDRALAZINE HYDROCHLORIDE 20 MG/ML
2.5-5 INJECTION INTRAMUSCULAR; INTRAVENOUS EVERY 10 MIN PRN
Status: DISCONTINUED | OUTPATIENT
Start: 2022-12-12 | End: 2022-12-12 | Stop reason: HOSPADM

## 2022-12-12 RX ORDER — OXYCODONE HYDROCHLORIDE 5 MG/1
10 TABLET ORAL EVERY 4 HOURS PRN
Status: DISCONTINUED | OUTPATIENT
Start: 2022-12-12 | End: 2022-12-13 | Stop reason: HOSPADM

## 2022-12-12 RX ORDER — CEPHALEXIN 500 MG/1
500 CAPSULE ORAL EVERY 6 HOURS SCHEDULED
Status: DISCONTINUED | OUTPATIENT
Start: 2022-12-13 | End: 2022-12-13 | Stop reason: HOSPADM

## 2022-12-12 RX ADMIN — SENNOSIDES AND DOCUSATE SODIUM 1 TABLET: 50; 8.6 TABLET ORAL at 22:08

## 2022-12-12 RX ADMIN — SODIUM CHLORIDE: 9 INJECTION, SOLUTION INTRAVENOUS at 15:33

## 2022-12-12 RX ADMIN — PROPOFOL 50 MG: 10 INJECTION, EMULSION INTRAVENOUS at 10:31

## 2022-12-12 RX ADMIN — DEXAMETHASONE SODIUM PHOSPHATE 10 MG: 4 INJECTION, SOLUTION INTRA-ARTICULAR; INTRALESIONAL; INTRAMUSCULAR; INTRAVENOUS; SOFT TISSUE at 10:24

## 2022-12-12 RX ADMIN — MIDAZOLAM HYDROCHLORIDE 2 MG: 1 INJECTION, SOLUTION INTRAMUSCULAR; INTRAVENOUS at 09:20

## 2022-12-12 RX ADMIN — LIDOCAINE HYDROCHLORIDE 100 MG: 20 INJECTION, SOLUTION INFILTRATION; PERINEURAL at 10:31

## 2022-12-12 RX ADMIN — OXYCODONE HYDROCHLORIDE 5 MG: 5 TABLET ORAL at 22:55

## 2022-12-12 RX ADMIN — PROPOFOL 100 MCG/KG/MIN: 10 INJECTION, EMULSION INTRAVENOUS at 10:31

## 2022-12-12 RX ADMIN — OXYCODONE HYDROCHLORIDE 5 MG: 5 TABLET ORAL at 18:50

## 2022-12-12 RX ADMIN — SODIUM CHLORIDE, POTASSIUM CHLORIDE, SODIUM LACTATE AND CALCIUM CHLORIDE: 600; 310; 30; 20 INJECTION, SOLUTION INTRAVENOUS at 09:00

## 2022-12-12 RX ADMIN — ASPIRIN 325 MG: 325 TABLET, COATED ORAL at 17:36

## 2022-12-12 RX ADMIN — Medication 2 G: at 10:24

## 2022-12-12 RX ADMIN — ACETAMINOPHEN 975 MG: 325 TABLET, FILM COATED ORAL at 15:32

## 2022-12-12 RX ADMIN — CEFAZOLIN SODIUM 2 G: 2 INJECTION, SOLUTION INTRAVENOUS at 17:36

## 2022-12-12 RX ADMIN — BUPIVACAINE HYDROCHLORIDE IN DEXTROSE 12.5 MG: 7.5 INJECTION, SOLUTION SUBARACHNOID at 10:28

## 2022-12-12 RX ADMIN — SODIUM CHLORIDE, POTASSIUM CHLORIDE, SODIUM LACTATE AND CALCIUM CHLORIDE: 600; 310; 30; 20 INJECTION, SOLUTION INTRAVENOUS at 11:53

## 2022-12-12 RX ADMIN — PROPOFOL 50 MG: 10 INJECTION, EMULSION INTRAVENOUS at 10:36

## 2022-12-12 RX ADMIN — TRAZODONE HYDROCHLORIDE 50 MG: 50 TABLET ORAL at 22:08

## 2022-12-12 RX ADMIN — ACETAMINOPHEN 975 MG: 325 TABLET, FILM COATED ORAL at 22:55

## 2022-12-12 RX ADMIN — ONDANSETRON 4 MG: 2 INJECTION INTRAMUSCULAR; INTRAVENOUS at 10:24

## 2022-12-12 RX ADMIN — TRANEXAMIC ACID 1 G: 10 INJECTION, SOLUTION INTRAVENOUS at 12:01

## 2022-12-12 RX ADMIN — TRANEXAMIC ACID 1 G: 10 INJECTION, SOLUTION INTRAVENOUS at 10:24

## 2022-12-12 ASSESSMENT — ACTIVITIES OF DAILY LIVING (ADL)
ADLS_ACUITY_SCORE: 20
ADLS_ACUITY_SCORE: 21
ADLS_ACUITY_SCORE: 20
ADLS_ACUITY_SCORE: 21

## 2022-12-12 ASSESSMENT — LIFESTYLE VARIABLES: TOBACCO_USE: 1

## 2022-12-12 NOTE — ANESTHESIA PROCEDURE NOTES
"Adductor canal and Femoral Procedure Note    Pre-Procedure   Staff -        Anesthesiologist:  Annette Santo MD       Performed By: anesthesiologist       Location: pre-op       Pre-Anesthestic Checklist: patient identified, IV checked, site marked, risks and benefits discussed, informed consent, monitors and equipment checked, pre-op evaluation, at physician/surgeon's request and post-op pain management  Timeout:       Correct Patient: Yes        Correct Procedure: Yes        Correct Site: Yes        Correct Position: Yes        Correct Laterality: Yes        Site Marked: Yes  Procedure Documentation  Procedure: Adductor canal, Femoral       Laterality: right       Patient Position: supine       Patient Prep/Sterile Barriers: sterile gloves, mask, \"no-touch\" technique        Skin prep: Chloraprep       Local skin infiltrated with 3 mL of 1% lidocaine.        Needle Type: insulated and short bevel ([place]unk)       Needle Gauge: 21.        Needle Length (millimeters): 100        Ultrasound guided       1. Ultrasound was used to identify targeted nerve, plexus, vascular marker, or fascial plane and place a needle adjacent to it in real-time.       2. Ultrasound was used to visualize the spread of anesthetic in close proximity to the above referenced structure.       3. A permanent image is entered into the patient's record.       4. The visualized anatomic structures appeared normal.       5. There were no apparent abnormal pathologic findings.    Assessment/Narrative         The placement was negative for: blood aspirated, painful injection and site bleeding       Paresthesias: No.       Test dose of mL at.         Test dose negative, 3 minutes after injection, for signs of intravascular, subdural, or intrathecal injection.       Bolus given via needle..        Secured via.        Insertion/Infusion Method: Single Shot       Complications: none       Injection made incrementally with aspirations every 5 mL.   " "  Comments:  0.5% Bupivacaine with 1:400,000 epinephrine injected.  Patient tolerated the procedure well.    The surgeon has given a verbal order transferring care of this patient to me for the performance of a regional analgesia block for post-op pain control. It is requested of me because I am uniquely trained and qualified to perform this block and the surgeon is neither trained nor qualified to perform this procedure.            FOR [facility] ([facility]) ONLY:   Pain Team Contact information: please page the Pain Team Via [employer]. Search \"Pain\". During daytime hours, please page the attending first. At night please page the resident first.    "

## 2022-12-12 NOTE — INTERVAL H&P NOTE
"I have reviewed the surgical (or preoperative) H&P that is linked to this encounter, and examined the patient. There are no significant changes    Clinical Conditions Present on Arrival:  Clinically Significant Risk Factors Present on Admission                    # Obesity: Estimated body mass index is 31.6 kg/m  as calculated from the following:    Height as of 11/15/22: 1.753 m (5' 9\").    Weight as of 11/15/22: 97.1 kg (214 lb).       "

## 2022-12-12 NOTE — ANESTHESIA POSTPROCEDURE EVALUATION
Patient: Aashish Aguilar    Procedure: Procedure(s):  RIGHT TOTAL KNEE ARTHROPLASTY       Anesthesia Type:  Spinal    Note:  Disposition: Admission   Postop Pain Control: Uneventful            Sign Out: Well controlled pain   PONV: No   Neuro/Psych: Uneventful            Sign Out: Acceptable/Baseline neuro status   Airway/Respiratory: Uneventful            Sign Out: Acceptable/Baseline resp. status   CV/Hemodynamics: Uneventful            Sign Out: Acceptable CV status; No obvious hypovolemia; No obvious fluid overload   Other NRE: NONE   DID A NON-ROUTINE EVENT OCCUR? No           Last vitals:  Vitals Value Taken Time   BP     Temp     Pulse 62 12/12/22 1247   Resp 19 12/12/22 1247   SpO2 95 % 12/12/22 1247   Vitals shown include unvalidated device data.    Electronically Signed By: Annette Santo MD  December 12, 2022  12:47 PM

## 2022-12-12 NOTE — ANESTHESIA PROCEDURE NOTES
"Intrathecal injection Procedure Note    Pre-Procedure   Staff -        Anesthesiologist:  Annette Santo MD       Performed By: anesthesiologist       Location: OR       Pre-Anesthestic Checklist: patient identified, IV checked, site marked, risks and benefits discussed, informed consent, monitors and equipment checked, pre-op evaluation, at physician/surgeon's request and post-op pain management  Timeout:       Correct Patient: Yes        Correct Procedure: Yes        Correct Site: Yes        Correct Position: Yes   Procedure Documentation  Procedure: intrathecal injection       Patient Position: sitting       Patient Prep/Sterile Barriers: sterile gloves, mask, patient draped       Skin prep: Betadine       Insertion Site: L3-4. (midline approach).       Spinal Needle Type: Saba-Maxi       Introducer used       Introducer: 20 G       # of attempts: 1 and  # of redirects:  0    Assessment/Narrative         Paresthesias: No.       CSF fluid: clear.     Comments:  12.5 mg 0.75% BUPIVACAINE WITH 8.25% DEXTROSE INJECTED. No paresthesia on injection. Patient tolerated the procedure well.      FOR [facility] ([facility]) ONLY:   Pain Team Contact information: please page the Pain Team Via [employer]. Search \"Pain\". During daytime hours, please page the attending first. At night please page the resident first.    "

## 2022-12-12 NOTE — PROVIDER NOTIFICATION
MD Notification    Notified Person: MD    Notified Person Name: Sylvia    Notification Date/Time: 12/12/22   5:46 PM      Notification Interaction: telephone     Purpose of Notification: Can we get an order for trazodone please?    Orders Received: trazodone ordered    Comments:

## 2022-12-12 NOTE — ANESTHESIA PREPROCEDURE EVALUATION
Anesthesia Pre-Procedure Evaluation    Patient: Aashish Aguilar   MRN: [id number] : 1960        Procedure : Procedure(s):  RIGHT TOTAL KNEE ARTHROPLASTY          Past Medical History:   Diagnosis Date     History of colonic polyps      Hypertension       Past Surgical History:   Procedure Laterality Date     ARTHROPLASTY KNEE Left 10/17/2022    Procedure: LEFT TOTAL KNEE ARTHROPLASTY;  Surgeon: Kirt Ward MD;  Location: [facility] OR     COLONOSCOPY       COLONOSCOPY N/A 2022    Procedure: COLONOSCOPY, FLEXIBLE, WITH LESION REMOVAL USING SNARE;  Surgeon: Merna Méndez MD;  Location:  GI     ZZC NONSPECIFIC PROCEDURE  1991    Left knee arthroscopy -- Abstracted 02     ZZC NONSPECIFIC PROCEDURE  2002    Right knee arthroscopy -- Abstracted 02      Allergies   Allergen Reactions     No Known Drug Allergies       Social History     Tobacco Use     Smoking status: Former     Types: Cigarettes     Quit date: 2000     Years since quittin.6     Smokeless tobacco: Never   Substance Use Topics     Alcohol use: Yes     Comment: 1drink a day      Wt Readings from Last 1 Encounters:   11/15/22 97.1 kg (214 lb)        Anesthesia Evaluation   Pt has had prior anesthetic.     No history of anesthetic complications       ROS/MED HX  ENT/Pulmonary:     (+) sleep apnea, doesn't use CPAP, tobacco use, Past use,     Neurologic:  - neg neurologic ROS     Cardiovascular:     (+) Dyslipidemia hypertension-----    METS/Exercise Tolerance:     Hematologic:       Musculoskeletal:   (+) arthritis,     GI/Hepatic:     (+) GERD, Asymptomatic on medication,     Renal/Genitourinary:  - neg Renal ROS     Endo:     (+) Obesity,  (-) Type II DM   Psychiatric/Substance Use:       Infectious Disease:       Malignancy:       Other:            Physical Exam    Airway        Mallampati: II   TM distance: > 3 FB   Neck ROM: full   Mouth opening: > 3 cm    Respiratory Devices and Support         Dental  no  notable dental history         Cardiovascular   cardiovascular exam normal          Pulmonary   pulmonary exam normal                OUTSIDE LABS:  CBC:   Lab Results   Component Value Date    WBC 3.9 (L) 08/31/2022    WBC 3.5 (L) 04/12/2022    HGB 12.2 (L) 11/15/2022    HGB 12.0 (L) 10/18/2022    HCT 44.2 08/31/2022    HCT 44.3 04/12/2022     10/17/2022     08/31/2022     BMP:   Lab Results   Component Value Date     10/17/2022     08/31/2022    POTASSIUM 4.4 10/17/2022    POTASSIUM 3.6 10/17/2022    CHLORIDE 103 10/17/2022    CHLORIDE 99 08/31/2022    CO2 28 10/17/2022    CO2 28 08/31/2022    BUN 14 10/17/2022    BUN 15 08/31/2022    CR 0.96 10/17/2022    CR 1.03 08/31/2022     (H) 10/18/2022     (H) 10/17/2022     COAGS: No results found for: PTT, INR, FIBR  POC: No results found for: BGM, HCG, HCGS  HEPATIC:   Lab Results   Component Value Date    ALBUMIN 4.4 08/31/2022    PROTTOTAL 7.5 08/31/2022    ALT 38 08/31/2022    AST 21 08/31/2022    ALKPHOS 73 08/31/2022    BILITOTAL 0.6 08/31/2022     OTHER:   Lab Results   Component Value Date    A1C 5.4 08/31/2022    JIMI 9.2 10/17/2022    TSH 1.88 08/31/2022       Anesthesia Plan    ASA Status:  2   NPO Status:  NPO Appropriate    Anesthesia Type: Spinal.              Consents    Anesthesia Plan(s) and associated risks, benefits, and realistic alternatives discussed. Questions answered and patient/representative(s) expressed understanding.    - Discussed:     - Discussed with:  Patient         Postoperative Care    Pain management: IV analgesics, Peripheral nerve block (Single Shot).   PONV prophylaxis: Ondansetron (or other 5HT-3)     Comments:                Annette Santo MD

## 2022-12-12 NOTE — ANESTHESIA CARE TRANSFER NOTE
Patient: Aashish Aguilar    Procedure: Procedure(s):  RIGHT TOTAL KNEE ARTHROPLASTY       Diagnosis: Osteoarthritis of right knee, unspecified osteoarthritis type [M17.11]  Diagnosis Additional Information: No value filed.    Anesthesia Type:   Spinal     Note:    Oropharynx: oropharynx clear of all foreign objects and spontaneously breathing  Level of Consciousness: awake  Oxygen Supplementation: room air    Independent Airway: airway patency satisfactory and stable  Dentition: dentition unchanged  Vital Signs Stable: post-procedure vital signs reviewed and stable  Report to RN Given: handoff report given  Patient transferred to: PACU    Handoff Report: Identifed the Patient, Identified the Reponsible Provider, Reviewed the pertinent medical history, Discussed the surgical course, Reviewed Intra-OP anesthesia mangement and issues during anesthesia, Set expectations for post-procedure period and Allowed opportunity for questions and acknowledgement of understanding      Vitals:  Vitals Value Taken Time   BP     Temp     Pulse 62 12/12/22 1244   Resp 12 12/12/22 1244   SpO2 100 % 12/12/22 1244   Vitals shown include unvalidated device data.    Electronically Signed By: MYRNA Whipple CRNA  December 12, 2022  12:46 PM

## 2022-12-12 NOTE — PROGRESS NOTES
12/12/22 1615   Appointment Info   Signing Clinician's Name / Credentials (PT) Jude Gonzalez DPT   Quick Adds   Quick Adds Certification       Present no   Living Environment   People in Home spouse;child(riky), adult   Current Living Arrangements house   Home Accessibility stairs within home   Number of Stairs, Within Home, Primary nine   Stair Railings, Within Home, Primary railing on right side (ascending)   Transportation Anticipated car, drives self;family or friend will provide   Living Environment Comments Pt lives in a house with his spouse. Pt's step son will be present for a period of time to provide any necessary assistance as well. Pt drives at baseline, but spouse or step son will provide rides at d/c.   Self-Care   Usual Activity Tolerance good   Current Activity Tolerance moderate   Regular Exercise No   Equipment Currently Used at Home none   Fall history within last six months no   Activity/Exercise/Self-Care Comment Pt is IND at baseline without use of AD. Owns FWW from previous knee replacement from 8 weeks ago. Owns shower chair and raised toilet seat as well.   General Information   Onset of Illness/Injury or Date of Surgery 12/12/22   Referring Physician Kirt Ward MD   Patient/Family Therapy Goals Statement (PT) Return to walking comfortably   Pertinent History of Current Problem (include personal factors and/or comorbidities that impact the POC) Pt is POD #0 R TKA.   Existing Precautions/Restrictions weight bearing   Weight-Bearing Status - RLE weight-bearing as tolerated   Cognition   Affect/Mental Status (Cognition) WNL   Orientation Status (Cognition) oriented x 4   Integumentary/Edema   Integumentary/Edema Comments R knee wrapped in ACE wrap and post-op bandages. no signs of bleeding noted   Posture    Posture Not impaired   Range of Motion (ROM)   Range of Motion ROM deficits secondary to surgical procedure;ROM deficits secondary to swelling   ROM Comment  R knee flexion PROM: 95 degrees   Strength (Manual Muscle Testing)   Strength (Manual Muscle Testing) Able to perform R SLR;Able to perform L SLR   Strength Comments mild functional strength deficits noted secondary to post-op pain and swelling   Bed Mobility   Comment, (Bed Mobility) SBA   Transfers   Comment, (Transfers) sit>stand w/ FWW SBA   Gait/Stairs (Locomotion)   Distance in Feet 10' eval   Distance in Feet (Gait) 200'   Comment, (Gait/Stairs) pt ambulated 10' for eval with FWW and SBA. Demonstrating good step throught gait pattern with good safety and stability throughout. no LOB or lateral steppage noted. mild decreased stance time on RLE   Balance   Balance Comments impaired dynamic balance secondary to needing FWW for ambulation   Sensory Examination   Sensory Perception patient reports no sensory changes   Clinical Impression   Criteria for Skilled Therapeutic Intervention Yes, treatment indicated   PT Diagnosis (PT) impaired functional mobility   Influenced by the following impairments post-op pain and swelling of R knee, impaired dynamic balance, impaired activity tolerance, mild functional LE strength deficit   Functional limitations due to impairments limited independence with functional mobility   Clinical Presentation (PT Evaluation Complexity) Stable/Uncomplicated   Clinical Presentation Rationale clinical judgement   Clinical Decision Making (Complexity) low complexity   Planned Therapy Interventions (PT) balance training;bed mobility training;gait training;patient/family education;stair training;strengthening;transfer training;progressive activity/exercise;home program guidelines   Anticipated Equipment Needs at Discharge (PT) walker, rolling   Risk & Benefits of therapy have been explained evaluation/treatment results reviewed;care plan/treatment goals reviewed;risks/benefits reviewed;current/potential barriers reviewed;participants voiced agreement with care plan;participants  included;patient   PT Total Evaluation Time   PT Eval, Low Complexity Minutes (99542) 8   Plan of Care Review   Plan of Care Reviewed With patient   Therapy Certification   Start of care date 12/12/22   Certification date from 12/12/22   Certification date to 12/17/22   Medical Diagnosis R total knee arthroplasty   Physical Therapy Goals   PT Frequency 2x/day   PT Predicted Duration/Target Date for Goal Attainment 12/17/22   PT Goals Bed Mobility;Transfers;Gait;Stairs   PT: Bed Mobility Independent;Supine to/from sit   PT: Transfers Modified independent;Sit to/from stand;Bed to/from chair;Assistive device   PT: Gait Modified independent;Rolling walker;Greater than 200 feet   PT: Stairs Supervision/stand-by assist;9 stairs;Rail on right   Interventions   Interventions Quick Adds Gait Training;Therapeutic Activity;Therapeutic Procedure   Therapeutic Procedure/Exercise   Ther. Procedure: strength, endurance, ROM, flexibillity Minutes (09250) 5   Symptoms Noted During/After Treatment none   Treatment Detail/Skilled Intervention Pt performed supine exercises as follows: ankle pumps x10 reps, quad sets bilateral x10 reps, partial ROM SLR on RLE x5 reps, heel slides x7 reps. Cueing to move slow and controlled through the full available ROM.   Therapeutic Activity   Therapeutic Activities: dynamic activities to improve functional performance Minutes (29141) 10   Symptoms Noted During/After Treatment Fatigue   Treatment Detail/Skilled Intervention Greeted pt upon arrival to room. Pt agreeable to working with PT. PT evaluation completed and role of IP PT explained to pt. Supine>sit w/ SBA. Sit>stand w/ FWW and SBA. Cueing for safe and efficient sit<>stand procedure including scooting to the front edge of the chair, to lean forward with nose over toes, and hand and foot placement. Following ambulation, pt stand>sit to recliner with SBA and FWW. Cueing to feel the chair with both legs prior to sitting and to extend R knee  "slightly prior to sitting to decrease experienced discomfort during the eccentric portion of sitting. Pt left sitting up in recliner at end of session with all needs met, call light within reach, chair alarm on, and RN updated.   Gait Training   Gait Training Minutes (70692) 15   Symptoms Noted During/After Treatment (Gait Training) fatigue   Treatment Detail/Skilled Intervention Pt ambulated 200' with FWW and SBA. Pt demonstrating good step-through gait pattern and good proximity to FWW throughout. Mild decreased stance time noted on RLE with improved bilateral stance time following cueing for decrease in gait speed. Pt ascended and descended 8 steps with handrail support and CGA. Cueing for \"up with the good and down with the bad\". One instance of mild LOB when pt attempted to lead with the incorrect foot when descending a step.   PT Discharge Planning   PT Plan PT: review stair navigation, increase ambulation distance, bed mobility   PT Rationale for DC Rec Pt is currently below his baseline functional mobility level, but is demonstrating safety and efficiency with use of FWW for functional mobility at this time. Ambulating ~200' with FWW on day of surgery and demonstrating ability to navigate 8 stairs with assist of one. Recommend discharge home with intermittent assist from spouse and step son. Pt noting he already has outpatient therapy scheduled as well.   PT Brief overview of current status SBA with FWW for ambulation and transfers   Total Session Time   Timed Code Treatment Minutes 30   Total Session Time (sum of timed and untimed services) 38       [facility]  OUTPATIENT PHYSICAL THERAPY EVALUATION  PLAN OF TREATMENT FOR OUTPATIENT REHABILITATION  (COMPLETE FOR INITIAL CLAIMS ONLY)  Patient's Last Name, First Name, M.I.  YOB: 1960  Aashish Aguilar                        Provider's Name  [facility] Medical Record No.  [id number]      "                        Onset Date:  12/12/22   Start of Care Date:  12/12/22   Type:     _X_PT   ___OT   ___SLP Medical Diagnosis:  R total knee arthroplasty              PT Diagnosis:  impaired functional mobility Visits from SOC:  1     See note for plan of treatment, functional goals and certification details    I CERTIFY THE NEED FOR THESE SERVICES FURNISHED UNDER        THIS PLAN OF TREATMENT AND WHILE UNDER MY CARE     (Physician co-signature of this document indicates review and certification of the therapy plan).

## 2022-12-13 ENCOUNTER — APPOINTMENT (OUTPATIENT)
Dept: PHYSICAL THERAPY | Facility: CLINIC | Age: 62
End: 2022-12-13
Attending: ORTHOPAEDIC SURGERY
Payer: COMMERCIAL

## 2022-12-13 VITALS
RESPIRATION RATE: 16 BRPM | OXYGEN SATURATION: 97 % | SYSTOLIC BLOOD PRESSURE: 97 MMHG | WEIGHT: 212.8 LBS | TEMPERATURE: 98.6 F | HEART RATE: 58 BPM | HEIGHT: 69 IN | BODY MASS INDEX: 31.52 KG/M2 | DIASTOLIC BLOOD PRESSURE: 59 MMHG

## 2022-12-13 LAB
GLUCOSE BLDC GLUCOMTR-MCNC: 87 MG/DL (ref 70–99)
GLUCOSE BLDC GLUCOMTR-MCNC: 94 MG/DL (ref 70–99)
HGB BLD-MCNC: 11.3 G/DL (ref 13.3–17.7)

## 2022-12-13 PROCEDURE — 85018 HEMOGLOBIN: CPT | Performed by: ORTHOPAEDIC SURGERY

## 2022-12-13 PROCEDURE — 258N000003 HC RX IP 258 OP 636: Performed by: ORTHOPAEDIC SURGERY

## 2022-12-13 PROCEDURE — 97530 THERAPEUTIC ACTIVITIES: CPT | Mod: GP

## 2022-12-13 PROCEDURE — 82962 GLUCOSE BLOOD TEST: CPT

## 2022-12-13 PROCEDURE — 99203 OFFICE O/P NEW LOW 30 MIN: CPT | Performed by: PHYSICIAN ASSISTANT

## 2022-12-13 PROCEDURE — 93005 ELECTROCARDIOGRAM TRACING: CPT

## 2022-12-13 PROCEDURE — 93010 ELECTROCARDIOGRAM REPORT: CPT | Performed by: INTERNAL MEDICINE

## 2022-12-13 PROCEDURE — 250N000013 HC RX MED GY IP 250 OP 250 PS 637: Performed by: ORTHOPAEDIC SURGERY

## 2022-12-13 PROCEDURE — 36415 COLL VENOUS BLD VENIPUNCTURE: CPT | Performed by: ORTHOPAEDIC SURGERY

## 2022-12-13 PROCEDURE — 97116 GAIT TRAINING THERAPY: CPT | Mod: GP

## 2022-12-13 PROCEDURE — 258N000003 HC RX IP 258 OP 636: Performed by: PHYSICIAN ASSISTANT

## 2022-12-13 PROCEDURE — 250N000011 HC RX IP 250 OP 636: Performed by: ORTHOPAEDIC SURGERY

## 2022-12-13 PROCEDURE — 999N000111 HC STATISTIC OT IP EVAL DEFER: Performed by: OCCUPATIONAL THERAPIST

## 2022-12-13 RX ORDER — CEPHALEXIN 500 MG/1
500 CAPSULE ORAL EVERY 6 HOURS
Qty: 20 CAPSULE | Refills: 0 | Status: SHIPPED | OUTPATIENT
Start: 2022-12-13 | End: 2022-12-13

## 2022-12-13 RX ORDER — CEPHALEXIN 500 MG/1
500 CAPSULE ORAL EVERY 6 HOURS
Qty: 20 CAPSULE | Refills: 0 | Status: SHIPPED | OUTPATIENT
Start: 2022-12-13

## 2022-12-13 RX ORDER — OXYCODONE HYDROCHLORIDE 5 MG/1
5 TABLET ORAL EVERY 4 HOURS PRN
Qty: 33 TABLET | Refills: 0 | Status: SHIPPED | OUTPATIENT
Start: 2022-12-13 | End: 2022-12-13

## 2022-12-13 RX ORDER — OXYCODONE HYDROCHLORIDE 5 MG/1
5-10 TABLET ORAL EVERY 6 HOURS PRN
Qty: 33 TABLET | Refills: 0 | Status: SHIPPED | OUTPATIENT
Start: 2022-12-13

## 2022-12-13 RX ORDER — LISINOPRIL AND HYDROCHLOROTHIAZIDE 12.5; 2 MG/1; MG/1
1 TABLET ORAL DAILY
COMMUNITY
Start: 2022-12-13 | End: 2023-07-19

## 2022-12-13 RX ADMIN — ONDANSETRON 4 MG: 4 TABLET, ORALLY DISINTEGRATING ORAL at 11:55

## 2022-12-13 RX ADMIN — CEFAZOLIN SODIUM 2 G: 2 INJECTION, SOLUTION INTRAVENOUS at 01:55

## 2022-12-13 RX ADMIN — SODIUM CHLORIDE 500 ML: 9 INJECTION, SOLUTION INTRAVENOUS at 15:41

## 2022-12-13 RX ADMIN — LISINOPRIL AND HYDROCHLOROTHIAZIDE 1 TABLET: 12.5; 2 TABLET ORAL at 09:41

## 2022-12-13 RX ADMIN — OXYCODONE HYDROCHLORIDE 5 MG: 5 TABLET ORAL at 09:41

## 2022-12-13 RX ADMIN — CEPHALEXIN 500 MG: 500 CAPSULE ORAL at 13:14

## 2022-12-13 RX ADMIN — ASPIRIN 325 MG: 325 TABLET, COATED ORAL at 09:41

## 2022-12-13 RX ADMIN — POLYETHYLENE GLYCOL 3350 17 G: 17 POWDER, FOR SOLUTION ORAL at 09:42

## 2022-12-13 RX ADMIN — SENNOSIDES AND DOCUSATE SODIUM 1 TABLET: 50; 8.6 TABLET ORAL at 09:41

## 2022-12-13 RX ADMIN — OXYCODONE HYDROCHLORIDE 5 MG: 5 TABLET ORAL at 13:35

## 2022-12-13 RX ADMIN — OXYCODONE HYDROCHLORIDE 5 MG: 5 TABLET ORAL at 05:09

## 2022-12-13 RX ADMIN — CEPHALEXIN 500 MG: 500 CAPSULE ORAL at 07:38

## 2022-12-13 RX ADMIN — SODIUM CHLORIDE 1000 ML: 9 INJECTION, SOLUTION INTRAVENOUS at 11:32

## 2022-12-13 RX ADMIN — ACETAMINOPHEN 975 MG: 325 TABLET, FILM COATED ORAL at 06:26

## 2022-12-13 RX ADMIN — ACETAMINOPHEN 975 MG: 325 TABLET, FILM COATED ORAL at 14:27

## 2022-12-13 ASSESSMENT — ACTIVITIES OF DAILY LIVING (ADL)
ADLS_ACUITY_SCORE: 21

## 2022-12-13 NOTE — PROGRESS NOTES
"ORTHOPEDIC LOWER EXTREMITY PROGRESS NOTE    POD#1  Patient is a 62 year old male who underwent Procedure(s):  RIGHT TOTAL KNEE ARTHROPLASTY on 2022. Pain is well controlled. Tolerating medication well, no nausea or vomiting.  Had other knee replaced in October.  Discharge instructions reviewed.    Vitals:   Blood pressure (!) 156/89, pulse 71, temperature 98.6  F (37  C), temperature source Oral, resp. rate 16, height 1.753 m (5' 9\"), weight 96.5 kg (212 lb 12.8 oz), SpO2 100 %.  Temp (24hrs), Av.1  F (36.7  C), Min:97.8  F (36.6  C), Max:98.6  F (37  C)      Drains: already removed this morning    EXAM   The patient is awake and alert.  Calves are soft and non-tender.   Sensation is intact.  Dorsiflexion and plantar flexion is intact.  Foot warm with nl cap refill.  The incision is covered with AG dressing.     Labs:   Recent Labs   Lab Test 11/15/22  1358 10/18/22  0724 10/17/22  0619   HGB 12.2* 12.0* 14.2       ASSESSMENT  S/p R TKA   PLAN  1. DVT prophylaxis: aspirin  2. Weight Bearing: WBAT (Weight bearing as tolerated).  3. Anticipated discharge date today . Discharge to Home with Outpatient Treatment.  4. Cont Pain Control Oxycodone and Tylenol   5. Awaiting morning Hgb result    Altagracia Hernandez PA-C  [facility]        "

## 2022-12-13 NOTE — PLAN OF CARE
Patient vital signs are at baseline: Yes. Dionte at times  Patient able to ambulate as they were prior to admission or with assist devices provided by therapies during their stay:  yes; A1-GB/W  Patient MUST void prior to discharge: Yes  Patient able to tolerate oral intake:  Yes  Pain has adequate pain control using Oral analgesics: yes. PRN oxy x1  Does patient have an identified :  Yes, son Valentín   Has goal D/C date and time been discussed with patient: Yes    A&Ox4. CMS- tingling RLE. Ace wrap. R-PIV infusing NS at 100 mL/hr. Hemovac- moderate bright red draiange

## 2022-12-13 NOTE — PROGRESS NOTES
Physical Therapy Discharge Summary    Reason for therapy discharge:    Discharged to home.    Progress towards therapy goal(s). See goals on Care Plan in [facility] electronic health record for goal details.  Adequate for safe discharge home    Therapy recommendation(s):    Per below       12/13/22 0946   Appointment Info   Signing Clinician's Name / Credentials (PT) Brittany Dressler, PT   Gait/Stairs (Locomotion)   Distance in Feet (Gait) 150' + 150' RW supervision UE WB modified step-through with mild affected LE offloading, fair pace, steady. Safety reassessment and WC follow by PT.   Therapeutic Procedure/Exercise   Treatment Detail/Skilled Intervention Knee ext -10, knee flx 98. Reviewed ext in bed with pillow under lower leg to help ext.   Therapeutic Activity   Therapeutic Activities: dynamic activities to improve functional performance Minutes (21051) 10   Symptoms Noted During/After Treatment None   Treatment Detail/Skilled Intervention Pt agreeable to PT: Hany supine-sit, indpeendent EOB sit balance, Hany sit-stands wtih UE use and symetrical LE positioning though some affected LE offloading - good hand placement. 2 stands. Multiple pivots B RW supervision. Finished in chair alarm armed. Reviewed HEP and progressive mobility with good pt understanding. Reviewed car transfer technique, with pt reporting able to use handle and sit then pivot into his SUV.   Gait Training   Gait Training Minutes (35945) 15   Symptoms Noted During/After Treatment (Gait Training) fatigue   Treatment Detail/Skilled Intervention 4 stairs 1 rail step-to with pt teach back of sequencing - accurate, steady, safe per PT re-assessment.   PT Discharge Planning   PT Plan Acute PT needs met   PT Discharge Recommendation (DC Rec)   (Per ortho)   PT Rationale for DC Rec Pt safe to return home with his RW and supervision with upright mobility.   PT Brief overview of current status Bed mob and stands Hany, pivots and amb and stairs supervision  with RW or rail.   Total Session Time   Timed Code Treatment Minutes 25   Total Session Time (sum of timed and untimed services) 25

## 2022-12-13 NOTE — OP NOTE
Procedure Date: 12/12/2022    PREOPERATIVE DIAGNOSIS:  Severe osteoarthrosis of the right knee.    POSTOPERATIVE DIAGNOSIS:  Severe osteoarthrosis of the right knee.    PROCEDURE:  Right total knee arthroplasty.    SURGEON:  Kirt Ward M.D.     ASSISTANT:  PARTHA Payton, [facility]    ANESTHESIA:  Spinal plus nerve block and sedation.    ANTIBIOTICS:  Two grams Ancef given preoperatively.     DVT PROPHYLAXIS PLAN:  Sequential calf compression devices, MELINA hose and aspirin.    IMPLANTS:  Biomet Vanguard knee cemented 72.5 posterior stabilized femur, 79 tibial tray, 14 mm posterior stabilized plus size stem E-poly.  The button was a 37 mm round all-poly patellar button.  One gram powdered vancomycin placed into the joint as well at the end of the case.     DRAINS:  One Hemovac.    COMPLICATIONS:  None.    ESTIMATED BLOOD LOSS:  20 mL    DESCRIPTION OF PROCEDURE:  Risks, options, alternatives were gone over with the patient.  Identification made, brought to the operating room.  A timeout was requested at appropriate time.  Tourniquet which was padded and protected on the upper thigh was inflated after exsanguination.  I made a midline incision with a medial parapatellar arthrotomy, resected the fat pad, let the patella over, resected it for a size 37 button.  We proceeded then to flex the knee up.  End-stage arthrosis was noted of the patellofemoral joint and the entire medial compartment.  We debrided away some of the cartilage and the more normal layer.  This allowed the cutting guide to be fitted better.  Intramedullary guiding system was utilized to produce a 5-degree valgus cut.  I took a +2 cut because of the flexion contracture and then anterior, posterior chamfers made.  Box opening placed.  Large osteophytes present.  Debrided all those away.  Then proceeded to close the tibial based on preoperative templating, resected it.  ACL and PCL debrided away.  Meniscectomy was completed.  Fixed I-beam post  placed.  Trials were then done.  We then drilled cement anchoring holes into the tibial surface.  I then proceeded to irrigate copiously and began injecting the pain cocktail in the posterior capsule, 1 mm increments under direct vision and constant re-aspiration.    Cleaned things out carefully and thoroughly and then cemented all three components in simultaneously.  I elected to go with the size 14 posterior stabilized plus size stem E-poly.  Tibia 79, patella 37, femur 72.5.    We then proceeded to repair the retinaculum.  Reapproximated with interrupted 0 Ethibond followed by running #1 Stratafix.  0 Vicryl, 2-0 Vicryl, staples.  Drain was brought out.  Put powdered vancomycin into the joint.    Sponge and needle counts were correct x2.  The patient had pressure applied from toes to groin.  Transferred awake, alert to recovery room.  Tourniquet was let down appropriately.  Toes pinked up quickly.  Estimated tourniquet time about an hour and a half/hour and 20 minutes.    Kirt Ward MD        D: 2022   T: 2022   MT: ERICK    Name:     RADHA HUNTER ELAINA  MRN:      [id number]        Account:        [id number]   :      1960           Procedure Date: 2022     Document: [id number]

## 2022-12-13 NOTE — PLAN OF CARE
Summary:    POD 1 R-TKA  Patient vital signs are at baseline: No, bee and hypotensive. Tele- SB   Patient able to ambulate as they were prior to admission or with assist devices provided by therapies during their stay:  yes; A1-2-GB/W  Patient MUST void prior to discharge: Yes  Patient able to tolerate oral intake:  Yes  Pain has adequate pain control using Oral analgesics: yes. PRN oxy x1, schedule tylenol   Does patient have an identified :  Yes, son Valentín   Has goal D/C date and time been discussed with patient: Yes    A&Ox4. Regular diet. CMS- intact. R knee dressing CDI. Syncopal episode after getting dressed- diaphoretic, nauseous, dizzy, light-headed. Pt passed out for a couple seconds. 15 minutes later pt started experiencing the same symptoms, but did not pass out. Pt hypotensive. New IV placed. Bolus given, hospitalist consulted. EKG completed. Negative orthostatic BPs but still hypotensive and bradycardic. Another bolus ordered. Discharge pending

## 2022-12-13 NOTE — PLAN OF CARE
Occupational Therapy: orders received and chart reviewed. Pt just had L knee replaced 8 weeks ago. Reports he has all his equipment set up at home and has no concerns about dressing, bathing or bathroom transfers. Will complete OT orders per his request

## 2022-12-13 NOTE — PROVIDER NOTIFICATION
MD Notification    Notified Person: MD    Notified Person Name: Owen Crisostomo     Notification Date/Time: 12/13/22   2:06 PM     Notification Interaction: Webbased pagin    Purpose of Notification: FYI bolus finished. Orthostatic Bps negative. Recommendations?    Orders Received:      12/13/22  2:56 PM   Pt is still hypotensive. Tele- sinus bee. We did go for a walk and he's asymptomatic, just having some pain.    Repeat bolus. Repeat orthostatic BP. Re-evaluate after    Comments:

## 2022-12-13 NOTE — PROGRESS NOTES
Patient vital signs are at baseline: Yes  Patient able to ambulate as they were prior to admission or with assist devices provided by therapies during their stay:  Yes  Patient MUST void prior to discharge:  Yes  Patient able to tolerate oral intake:  Yes  Pain has adequate pain control using Oral analgesics:  Yes  Does patient have an identified :  Yes  Has goal D/C date and time been discussed with patient:  Yes     Pt is A/O x4. VSS on RA. Cont B/B. Up with A/1 GB/W, WBAT to RLE. Regular diet. Dressing CDI. CMS intact.Pain managed with scheduled tylenol, PRN oxy x2, and ice. PIV SL.

## 2022-12-13 NOTE — PROVIDER NOTIFICATION
MD Notification    Notified Person: PA    Notified Person Name: Owen Crisostomo    Notification Date/Time: 12/13/22 @ 1720    Notification Interaction: Web page    Purpose of Notification: NS 500ml bolus done. Orthostatic BP done and WNL. Pt denies any symptoms @ this moment. Can he go home or stay for one more night?    Orders Received:Pt can go home.     Comments:

## 2022-12-13 NOTE — CONSULTS
[facility]  Consult Note - Hospitalist Service     Date of Admission:  12/12/2022  Consult Requested by: Dr. Ward - Orthopedic Surgery  Reason for Consult: Syncope on POD#1  PRIMARY CARE PROVIDER:    Justin Broussard & Jesica PATEL Jeff is a very pleasant 62 year old male with a past medical history of hypertension and osteoarthritis who underwent a scheduled right total knee arthroplasty on 12/12/2022 with Dr. Ward for treatment of osteoarthritis that had failed nonsurgical management.  Patient had reportedly been doing well postoperatively, but on POD #1 just prior to discharge had a syncopal episode.  The hospitalist service was consulted for further evaluation.    Syncopal episode  Patient describes standing up after bending over to adjust his socks and shoes, then experienced severe pain in the right knee, and immediately after became lightheaded, diaphoretic, and passed out.  Episode sounds likely vasovagal in nature.  He was noted to be hypotensive during the episode with BP as low as 92/56.  Heart rate in the 50s-60s.  Patient recovered quickly, and currently denies any symptoms.  He did not have any preceding palpitations, chest pain, shortness of breath, focal weakness or numbness.   Patient does report history of hypertension normally, and has been on lisinopril-hydrochlorothiazide and tolerated in the past.  He did receive it this morning.  Labs reviewed, hemoglobin 12.2--11.3, expected after surgery.  Unlikely contributing to his syncope.  States that he had a similar episode on his previous knee surgery on the left side months ago, but not this severe, and did not fully lose consciousness at that time.  Otherwise he has not had syncope since he was much younger and that was attributed to dehydration.  Denies any known history of CAD, MI, VTE issue or other significant cardiopulmonary disease.    -- Agree with 500 cc IV saline bolus  -- EKG obtained, shows  sinus bradycardia, no acute ischemic changes seen.  ST elevation seen on computer reading not noted on personal review, erroneous read.  -- Monitor BP and vitals  -- Check orthostatics once fluid bolus completed.  Can repeat as necessary.  -- Patient would like to try to discharge today if possible, but recommend at least 4 hours of monitoring on telemetry and repeat attempts at ambulation and checking orthostatic vitals to make sure symptoms do not recur.  However if his symptoms recur, would have him remain in the hospital overnight for monitoring and further testing.    Status post right TKA  -- Routine management per orthopedic surgery    Hypertension  -- Management as above.  Hold lisinopril-hydrochlorothiazide for now.    Acute blood loss anemia  Noted by primary care provider.  Patient reports he started taking iron supplement and vitamin C.  --Hemoglobin postoperatively 11.3, down from 12.2.  This is in his more recent range, but still low.  Expected drop after surgery and IV fluid, however patient should follow-up closely with his primary care provider.   10/17/22 06:19 10/18/22 07:24 11/15/22 13:58 12/13/22 08:23   Hemoglobin 14.2 12.0 (L) 12.2 (L) 11.3 (L)       Diet: Advance Diet as Tolerated: Regular Diet Adult  Discharge Instruction - Regular Diet Adult  Diet     DVT Prophylaxis: Defer to primary service   Driver Catheter: Not present  Code Status: Full Code     Disposition Plan       Expected Discharge Date: 12/13/2022      Destination: home        Entered: RICH Sibley 12/13/2022, 12:17 PM        The patient's care was discussed with the Bedside Nurse and Patient.    The patient has been discussed with Dr. Masters, who agrees with the assessment and plan at this time.      The hospitalist service would like to thank Dr. Ward for the consult.  We will continue to follow the patient while hospitalized.        RICH Sibley  [facility]  Hospital    ______________________________________________________________________    Chief Complaint   Syncope    History is obtained from the patient and EMR.      History of Present Illness   Aashish Aguilar is a very pleasant 62 year old male with a past medical history of hypertension and osteoarthritis who underwent a scheduled right total knee arthroplasty on 12/12/2022 with Dr. Ward for treatment of osteoarthritis that had failed nonsurgical management.  Patient had reportedly been doing well postoperatively, but on POD #1 just prior to discharge had a syncopal episode.  The hospitalist service was consulted for further evaluation.    Patient describes standing up after bending over to adjust his socks and shoes, then experienced severe pain in the right knee, and immediately after became lightheaded, diaphoretic, and passed out.  Episode sounds vasovagal in nature.  He was noted to be hypotensive during the episode with BP as low as 92/56.  Heart rate in the 50s-60s.  Patient recovered quickly, and currently denies any symptoms.  He did not have any preceding palpitations, chest pain, shortness of breath, focal weakness or numbness.   Patient does report history of hypertension normally, and has been on lisinopril-hydrochlorothiazide and tolerated in the past.  He did receive it this morning.  Labs reviewed, hemoglobin 12.2--11.3, expected after surgery.  Unlikely contributing to his syncope.  Patient aware of low hemoglobin and states he has followed with his PCP for this, on iron supplement and vitamin C.    Patient currently sitting up in the recliner with both legs up.  Denies any dizziness, lightheadedness, presyncope.  Denies any chest pain, shortness of breath, abdominal pain, vomiting, diarrhea, dysuria, focal weakness, numbness, or any vision changes, speech changes or headache.  He is alert and oriented.  States that he had a similar episode on his previous knee surgery on the left side months ago,  but not this severe, and did not fully lose consciousness at that time.  Otherwise he has not had syncope since he was much younger and that was attributed to dehydration.  Denies any known history of CAD, MI, VTE issue or other significant cardiopulmonary disease.    Review of Systems   The 10 point Review of Systems is negative other than noted in the HPI.    Past Medical History    I have reviewed this patient's medical history and updated it with pertinent information if needed.   Past Medical History:   Diagnosis Date     History of colonic polyps      Hypertension        Past Surgical History   I have reviewed this patient's surgical history and updated it with pertinent information if needed.  Past Surgical History:   Procedure Laterality Date     ARTHROPLASTY KNEE Left 10/17/2022    Procedure: LEFT TOTAL KNEE ARTHROPLASTY;  Surgeon: Kirt Ward MD;  Location:  OR     ARTHROPLASTY KNEE Right 2022    Procedure: RIGHT TOTAL KNEE ARTHROPLASTY;  Surgeon: Kirt Ward MD;  Location:  OR     COLONOSCOPY       COLONOSCOPY N/A 2022    Procedure: COLONOSCOPY, FLEXIBLE, WITH LESION REMOVAL USING SNARE;  Surgeon: Merna Méndez MD;  Location:  GI     RUST NONSPECIFIC PROCEDURE  1991    Left knee arthroscopy -- Abstracted 02     RUST NONSPECIFIC PROCEDURE  2002    Right knee arthroscopy -- Abstracted 02     Social History   I have reviewed this patient's social history and updated it with pertinent information if needed.    Social History     Tobacco Use     Smoking status: Former     Types: Cigarettes     Quit date: 2000     Years since quittin.6     Smokeless tobacco: Never   Vaping Use     Vaping Use: Never used   Substance Use Topics     Alcohol use: Yes     Comment: 1drink a day     Drug use: Yes     Types: Marijuana       Family History   I have reviewed this patient's family history and updated it with pertinent information if needed.   Family History    Problem Relation Age of Onset     Diabetes Sister         adult onset     Diabetes Sister         adult onset     Hypertension Sister      Hypertension Sister      Hypertension Brother      Cancer Brother         brain cancer     Breast Cancer Sister         remission       Medications   Prior to Admission Medications   Prescriptions Last Dose Informant Patient Reported? Taking?   acetaminophen (TYLENOL) 325 MG tablet  at prn Self No Yes   Sig: Take 2 tablets (650 mg) by mouth every 4 hours as needed for other (mild pain)   diphenhydrAMINE-acetaminophen (TYLENOL PM)  MG tablet Past Week at prn Self Yes No   Sig: Take 1 tablet by mouth nightly as needed for sleep   ferrous sulfate (FEROSUL) 325 (65 Fe) MG tablet 12/11/2022  Yes Yes   Sig: Take 325 mg by mouth daily (with breakfast)   ibuprofen (ADVIL/MOTRIN) 200 MG capsule Past Week at prn Self Yes No   Sig: Take 200-400 mg by mouth every 4 hours as needed for fever   lisinopril-hydrochlorothiazide (ZESTORETIC) 20-12.5 MG tablet 12/11/2022 at am Self No Yes   Sig: Take 1 tablet by mouth daily   oxyCODONE (ROXICODONE) 5 MG tablet More than a month at prn Self No No   Sig: Take 1-2 tablets (5-10 mg) by mouth every 4 hours as needed for moderate to severe pain   pravastatin (PRAVACHOL) 40 MG tablet 12/11/2022 at am Self No Yes   Sig: Take 1 tablet (40 mg) by mouth daily   senna-docusate (SENOKOT-S/PERICOLACE) 8.6-50 MG tablet More than a month at prn Self No No   Sig: Take 1-2 tablets by mouth 2 times daily Take while on oral narcotics to prevent or treat constipation.   traZODone (DESYREL) 50 MG tablet Past Week at pm Self No Yes   Sig: TAKE 1 TABLET(50 MG) BY MOUTH AT BEDTIME      Facility-Administered Medications Last Administration Doses Remaining   betamethasone acet & sod phos (CELESTONE) injection 6 mg 11/4/2020 10:54 AM    betamethasone acet & sod phos (CELESTONE) injection 6 mg 11/4/2020 10:54 AM    ropivacaine (NAROPIN) injection 3 mL 6/15/2020 11:22  AM    ropivacaine (NAROPIN) injection 3 mL 6/15/2020 11:22 AM         Allergies   Allergies   Allergen Reactions     No Known Drug Allergies        Physical Exam   Vital Signs: Temp: 98.6  F (37  C) Temp src: Oral BP: 103/59 Pulse: 57   Resp: 16 SpO2: 100 % O2 Device: None (Room air) Oxygen Delivery: 4 LPM  Weight: 212 lbs 12.8 oz    Constitutional: Well-appearing adult, reclining in the chair.  Awake, alert, cooperative, no apparent distress.    ENT: Normocephalic, without obvious abnormality, atraumatic, oropharynx with MMM.  Eyes pupils are equal and round, sclera nonicteric.    Neck: Supple  Pulmonary: No increased work of breathing, good air exchange, clear to auscultation bilaterally, no crackles or wheezing.  Cardiovascular: Bradycardic and regular, normal S1 and S2, and no murmur noted.  GI: Normal bowel sounds, soft, non-distended.  Skin/Integumen: Warm, dry, no rashes on exposed skin.  Neuro: CN II-XII grossly intact.  Speech fluent and intact, no facial droop.  Higher functions and strength appear intact.  Psych:  Alert and oriented to self, place, date, situation. Normal affect.  Extremities: Postop right TKA. Dorsal pedal pulses and posterior tibial pulses palpable.       Data   Results for orders placed or performed during the hospital encounter of 12/12/22 (from the past 24 hour(s))   XR Knee Port Right 1/2 Views    Narrative    EXAM: KNEE PORTABLE RIGHT ONE TO TWO VIEWS  DATE/TIME: 12/12/2022 2:34 PM     INDICATION: Right knee postoperative follow-up.   COMPARISON: 6/15/2020.      Impression    IMPRESSION:  1.  Right total knee arthroplasty, new. The components are well  seated.  2.  Superior knee drain. Postoperative intra-articular and soft tissue  gas. Anterior skin staples.  3.  No fracture.  4.  Atherosclerotic calcification.    DOLORES NELSON MD         SYSTEM ID:  [id number]   Basic metabolic panel   Result Value Ref Range    Sodium 138 133 - 144 mmol/L    Potassium 4.3 3.4 - 5.3 mmol/L     Chloride 106 94 - 109 mmol/L    Carbon Dioxide (CO2) 27 20 - 32 mmol/L    Anion Gap 5 3 - 14 mmol/L    Urea Nitrogen 13 7 - 30 mg/dL    Creatinine 0.96 0.66 - 1.25 mg/dL    Calcium 9.1 8.5 - 10.1 mg/dL    Glucose 130 (H) 70 - 99 mg/dL    GFR Estimate 89 >60 mL/min/1.73m2   Glucose by meter   Result Value Ref Range    GLUCOSE BY METER POCT 162 (H) 70 - 99 mg/dL   Glucose by meter   Result Value Ref Range    GLUCOSE BY METER POCT 94 70 - 99 mg/dL   Hemoglobin   Result Value Ref Range    Hemoglobin 11.3 (L) 13.3 - 17.7 g/dL   Glucose by meter   Result Value Ref Range    GLUCOSE BY METER POCT 87 70 - 99 mg/dL   EKG 12-lead, tracing only   Result Value Ref Range    Systolic Blood Pressure  mmHg    Diastolic Blood Pressure  mmHg    Ventricular Rate 58 BPM    Atrial Rate 58 BPM    VA Interval 164 ms    QRS Duration 90 ms     ms    QTc 410 ms    P Axis 53 degrees    R AXIS 52 degrees    T Axis 58 degrees    Interpretation ECG       Sinus bradycardia  Nonspecific ST abnormality  Abnormal ECG  When compared with ECG of 13-MAR-2006 12:28,  ST elevation now present in Inferior leads

## 2022-12-14 NOTE — PROGRESS NOTES
Pt was asleep @ arrivsal time. A/O x4. NS 500ml bolus given and Orthostatic BP done per order. BPs WNL. Denies any symptoms.Tele NSR. Paged PA if pt can go home or stay one more night. Pt can go home per PA. Eat dinner. Discharge instruction given. Pt sent home with AVS, meds and belongings.

## 2022-12-15 LAB
ATRIAL RATE - MUSE: 58 BPM
DIASTOLIC BLOOD PRESSURE - MUSE: NORMAL MMHG
INTERPRETATION ECG - MUSE: NORMAL
P AXIS - MUSE: 53 DEGREES
PR INTERVAL - MUSE: 164 MS
QRS DURATION - MUSE: 90 MS
QT - MUSE: 418 MS
QTC - MUSE: 410 MS
R AXIS - MUSE: 52 DEGREES
SYSTOLIC BLOOD PRESSURE - MUSE: NORMAL MMHG
T AXIS - MUSE: 58 DEGREES
VENTRICULAR RATE- MUSE: 58 BPM

## 2023-01-15 DIAGNOSIS — G47.00 INSOMNIA, UNSPECIFIED TYPE: ICD-10-CM

## 2023-01-16 RX ORDER — TRAZODONE HYDROCHLORIDE 50 MG/1
TABLET, FILM COATED ORAL
Qty: 90 TABLET | Refills: 0 | Status: SHIPPED | OUTPATIENT
Start: 2023-01-16 | End: 2023-04-24

## 2023-01-19 DIAGNOSIS — E78.5 HYPERLIPIDEMIA LDL GOAL <130: ICD-10-CM

## 2023-01-20 RX ORDER — PRAVASTATIN SODIUM 40 MG
TABLET ORAL
Qty: 90 TABLET | Refills: 0 | Status: SHIPPED | OUTPATIENT
Start: 2023-01-20

## 2023-04-23 DIAGNOSIS — G47.00 INSOMNIA, UNSPECIFIED TYPE: ICD-10-CM

## 2023-04-24 RX ORDER — TRAZODONE HYDROCHLORIDE 50 MG/1
TABLET, FILM COATED ORAL
Qty: 90 TABLET | Refills: 1 | Status: SHIPPED | OUTPATIENT
Start: 2023-04-24

## 2023-07-19 DIAGNOSIS — I10 BENIGN ESSENTIAL HYPERTENSION: ICD-10-CM

## 2023-07-19 RX ORDER — LISINOPRIL AND HYDROCHLOROTHIAZIDE 12.5; 2 MG/1; MG/1
1 TABLET ORAL DAILY
Qty: 90 TABLET | Refills: 0 | Status: SHIPPED | OUTPATIENT
Start: 2023-07-19 | End: 2023-10-26

## 2023-07-19 NOTE — TELEPHONE ENCOUNTER
Pharmacy requesting refill from Dr Broussard    Last office visit: 11/15/2022 with Isi Joiner, 8/31/2022 with Dr Broussard    Future Office Visit: None

## 2023-10-20 ENCOUNTER — PATIENT OUTREACH (OUTPATIENT)
Dept: GASTROENTEROLOGY | Facility: CLINIC | Age: 63
End: 2023-10-20
Payer: COMMERCIAL

## 2023-10-26 DIAGNOSIS — I10 BENIGN ESSENTIAL HYPERTENSION: ICD-10-CM

## 2023-10-26 RX ORDER — LISINOPRIL AND HYDROCHLOROTHIAZIDE 12.5; 2 MG/1; MG/1
1 TABLET ORAL DAILY
Qty: 90 TABLET | Refills: 0 | Status: SHIPPED | OUTPATIENT
Start: 2023-10-26 | End: 2023-12-08

## 2023-10-26 NOTE — TELEPHONE ENCOUNTER
Pharmacy seeking refills from Dr Broussard, RX last filled by Shalonda Hameed PA-C    Appointments in Next Year      Dec 08, 2023  1:00 PM  (Arrive by 12:40 PM)  Preventative Adult Visit with Linette Hunter MD  [facility] ([facility] ) [phone]

## 2023-10-26 NOTE — TELEPHONE ENCOUNTER
Isi Joiner PA-C no longer with [facility]  Patient has upcoming appointment with Dr. Hunter 12/8 for preventive care visit  90 day supply sent as bridge until patient can establish with new PCP.  Elsi Wilson PA-C on 10/26/2023 at 12:58 PM

## 2023-11-04 ENCOUNTER — HEALTH MAINTENANCE LETTER (OUTPATIENT)
Age: 63
End: 2023-11-04

## 2023-11-29 ENCOUNTER — MYC REFILL (OUTPATIENT)
Dept: FAMILY MEDICINE | Facility: CLINIC | Age: 63
End: 2023-11-29

## 2023-11-29 DIAGNOSIS — I10 BENIGN ESSENTIAL HYPERTENSION: ICD-10-CM

## 2023-11-30 RX ORDER — LISINOPRIL AND HYDROCHLOROTHIAZIDE 12.5; 2 MG/1; MG/1
1 TABLET ORAL DAILY
Qty: 90 TABLET | Refills: 0 | OUTPATIENT
Start: 2023-11-30

## 2023-12-08 ENCOUNTER — OFFICE VISIT (OUTPATIENT)
Dept: FAMILY MEDICINE | Facility: CLINIC | Age: 63
End: 2023-12-08
Attending: PHYSICIAN ASSISTANT

## 2023-12-08 VITALS
HEART RATE: 97 BPM | BODY MASS INDEX: 31.99 KG/M2 | RESPIRATION RATE: 16 BRPM | TEMPERATURE: 97.7 F | SYSTOLIC BLOOD PRESSURE: 130 MMHG | HEIGHT: 69 IN | DIASTOLIC BLOOD PRESSURE: 85 MMHG | OXYGEN SATURATION: 99 % | WEIGHT: 216 LBS

## 2023-12-08 DIAGNOSIS — R97.20 ELEVATED PROSTATE SPECIFIC ANTIGEN (PSA): ICD-10-CM

## 2023-12-08 DIAGNOSIS — Z23 NEEDS FLU SHOT: ICD-10-CM

## 2023-12-08 DIAGNOSIS — E78.5 HYPERLIPIDEMIA LDL GOAL <130: ICD-10-CM

## 2023-12-08 DIAGNOSIS — Z13.29 SCREENING FOR THYROID DISORDER: ICD-10-CM

## 2023-12-08 DIAGNOSIS — Z13.1 SCREENING FOR DIABETES MELLITUS: ICD-10-CM

## 2023-12-08 DIAGNOSIS — I10 BENIGN ESSENTIAL HYPERTENSION: ICD-10-CM

## 2023-12-08 DIAGNOSIS — Z00.00 ROUTINE HISTORY AND PHYSICAL EXAMINATION OF ADULT: Primary | ICD-10-CM

## 2023-12-08 DIAGNOSIS — L98.9 SKIN LESION OF BACK: ICD-10-CM

## 2023-12-08 DIAGNOSIS — Z12.5 SCREENING FOR PROSTATE CANCER: ICD-10-CM

## 2023-12-08 LAB
ANION GAP SERPL CALCULATED.3IONS-SCNC: 14 MMOL/L (ref 7–15)
BUN SERPL-MCNC: 13.6 MG/DL (ref 8–23)
CALCIUM SERPL-MCNC: 10.1 MG/DL (ref 8.8–10.2)
CHLORIDE SERPL-SCNC: 100 MMOL/L (ref 98–107)
CHOLEST SERPL-MCNC: 270 MG/DL
CREAT SERPL-MCNC: 1.09 MG/DL (ref 0.67–1.17)
DEPRECATED HCO3 PLAS-SCNC: 27 MMOL/L (ref 22–29)
EGFRCR SERPLBLD CKD-EPI 2021: 76 ML/MIN/1.73M2
FASTING STATUS PATIENT QL REPORTED: YES
GLUCOSE SERPL-MCNC: 109 MG/DL (ref 70–99)
HBA1C MFR BLD: 5.5 % (ref 0–5.6)
HDLC SERPL-MCNC: 81 MG/DL
LDLC SERPL CALC-MCNC: 174 MG/DL
NONHDLC SERPL-MCNC: 189 MG/DL
POTASSIUM SERPL-SCNC: 4 MMOL/L (ref 3.4–5.3)
PSA SERPL DL<=0.01 NG/ML-MCNC: 5.09 NG/ML (ref 0–4.5)
SODIUM SERPL-SCNC: 141 MMOL/L (ref 135–145)
TRIGL SERPL-MCNC: 74 MG/DL
TSH SERPL DL<=0.005 MIU/L-ACNC: 1.78 UIU/ML (ref 0.3–4.2)

## 2023-12-08 PROCEDURE — 90686 IIV4 VACC NO PRSV 0.5 ML IM: CPT | Performed by: INTERNAL MEDICINE

## 2023-12-08 PROCEDURE — 99396 PREV VISIT EST AGE 40-64: CPT | Mod: 25 | Performed by: INTERNAL MEDICINE

## 2023-12-08 PROCEDURE — 84443 ASSAY THYROID STIM HORMONE: CPT | Performed by: INTERNAL MEDICINE

## 2023-12-08 PROCEDURE — 90471 IMMUNIZATION ADMIN: CPT | Performed by: INTERNAL MEDICINE

## 2023-12-08 PROCEDURE — 36415 COLL VENOUS BLD VENIPUNCTURE: CPT | Performed by: INTERNAL MEDICINE

## 2023-12-08 PROCEDURE — 83036 HEMOGLOBIN GLYCOSYLATED A1C: CPT | Performed by: INTERNAL MEDICINE

## 2023-12-08 PROCEDURE — G0103 PSA SCREENING: HCPCS | Performed by: INTERNAL MEDICINE

## 2023-12-08 PROCEDURE — 80061 LIPID PANEL: CPT | Performed by: INTERNAL MEDICINE

## 2023-12-08 PROCEDURE — 80048 BASIC METABOLIC PNL TOTAL CA: CPT | Performed by: INTERNAL MEDICINE

## 2023-12-08 RX ORDER — LISINOPRIL AND HYDROCHLOROTHIAZIDE 12.5; 2 MG/1; MG/1
1 TABLET ORAL DAILY
Qty: 90 TABLET | Refills: 3 | Status: SHIPPED | OUTPATIENT
Start: 2023-12-08

## 2023-12-08 RX ORDER — LISINOPRIL AND HYDROCHLOROTHIAZIDE 12.5; 2 MG/1; MG/1
1 TABLET ORAL DAILY
Qty: 90 TABLET | Refills: 0 | Status: SHIPPED | OUTPATIENT
Start: 2023-12-08 | End: 2023-12-08

## 2023-12-08 ASSESSMENT — ENCOUNTER SYMPTOMS
COUGH: 0
SHORTNESS OF BREATH: 0
ARTHRALGIAS: 1
CHILLS: 0
MYALGIAS: 0
FEVER: 0
NERVOUS/ANXIOUS: 0
NAUSEA: 0
WEAKNESS: 0
HEADACHES: 0
HEMATOCHEZIA: 0
HEARTBURN: 0
DYSURIA: 0
DIZZINESS: 0
FREQUENCY: 0
DIARRHEA: 0
JOINT SWELLING: 1
EYE PAIN: 0
PARESTHESIAS: 0
PALPITATIONS: 0
SORE THROAT: 0
ABDOMINAL PAIN: 0
CONSTIPATION: 0
HEMATURIA: 0

## 2023-12-08 ASSESSMENT — PAIN SCALES - GENERAL: PAINLEVEL: NO PAIN (0)

## 2023-12-08 NOTE — PROGRESS NOTES
SUBJECTIVE:   Aashish is a 63 year old, presenting for the following:      Healthy Habits:     Getting at least 3 servings of Calcium per day:  Yes    Bi-annual eye exam:  NO    Dental care twice a year:  Yes    Sleep apnea or symptoms of sleep apnea:  None    Diet:  Regular (no restrictions)    Frequency of exercise:  2-3 days/week    Duration of exercise:  30-45 minutes    Taking medications regularly:  Yes    Medication side effects:  None    Additional concerns today:  Yes      Today's PHQ-2 Score:       2023    12:45 PM   PHQ-2 (  [employer])   Q1: Little interest or pleasure in doing things 0   Q2: Feeling down, depressed or hopeless 1   PHQ-2 Score 1   Q1: Little interest or pleasure in doing things Not at all   Q2: Feeling down, depressed or hopeless Several days   PHQ-2 Score 1       Social History     Tobacco Use    Smoking status: Former     Types: Cigarettes     Quit date: 2000     Years since quittin.6    Smokeless tobacco: Never   Substance Use Topics    Alcohol use: Yes     Comment: 1drink a day           2023    12:44 PM   Alcohol Use   Prescreen: >3 drinks/day or >7 drinks/week? No     Last PSA:   PSA   Date Value Ref Range Status   2021 2.17 0 - 4 ug/L Final     Comment:     Assay Method:  Chemiluminescence using Siemens Vista analyzer     Prostate Specific Antigen Screen   Date Value Ref Range Status   2022 2.66 0.00 - 4.00 ug/L Final       Reviewed orders with patient. Reviewed health maintenance and updated orders accordingly - Yes  Labs reviewed in EPIC    Reviewed and updated as needed this visit by clinical staff                  Reviewed and updated as needed this visit by Provider                 Past Medical History:   Diagnosis Date    History of colonic polyps     Hypertension           Review of Systems   ROS: 10 point ROS neg other than the symptoms noted above in the HPI.    Patient reports concerns about a chronic skin mole over the right upper back for  "over 2 years duration    OBJECTIVE:   /85 (BP Location: Left arm, Patient Position: Sitting, Cuff Size: Adult Large)   Pulse 97   Temp 97.7  F (36.5  C) (Temporal)   Resp 16   Ht 1.753 m (5' 9\")   Wt 98 kg (216 lb)   SpO2 99%   BMI 31.90 kg/m      Physical Exam  GENERAL:alert and no distress  EYES: Eyes grossly normal to inspection, conjunctivae and sclerae normal  HENT: ear canals and TM's normal, nose and mouth without ulcers or lesions  NECK: no asymmetry  RESP: lungs clear to auscultation  CV: regular rate and rhythm  ABDOMEN: soft, nontender, bowel sounds normal  MS: no gross musculoskeletal defects noted, no edema  SKIN: chronic appearing right upper back skin lesion present  NEURO: Normal strength and tone, mentation intact and speech normal  PSYCH: mentation appears normal, affect normal/bright    Diagnostic Test Results:  Labs reviewed in Epic    Results for orders placed or performed in visit on 12/08/23   Lipid panel reflex to direct LDL Fasting     Status: Abnormal   Result Value Ref Range    Cholesterol 270 (H) <200 mg/dL    Triglycerides 74 <150 mg/dL    Direct Measure HDL 81 >=40 mg/dL    LDL Cholesterol Calculated 174 (H) <=100 mg/dL    Non HDL Cholesterol 189 (H) <130 mg/dL    Patient Fasting > 8hrs? Yes     Narrative    Cholesterol  Desirable:  <200 mg/dL    Triglycerides  Normal:  Less than 150 mg/dL  Borderline High:  150-199 mg/dL  High:  200-499 mg/dL  Very High:  Greater than or equal to 500 mg/dL    Direct Measure HDL  Female:  Greater than or equal to 50 mg/dL   Male:  Greater than or equal to 40 mg/dL    LDL Cholesterol  Desirable:  <100mg/dL  Above Desirable:  100-129 mg/dL   Borderline High:  130-159 mg/dL   High:  160-189 mg/dL   Very High:  >= 190 mg/dL    Non HDL Cholesterol  Desirable:  130 mg/dL  Above Desirable:  130-159 mg/dL  Borderline High:  160-189 mg/dL  High:  190-219 mg/dL  Very High:  Greater than or equal to 220 mg/dL   PSA, screen     Status: Abnormal "   Result Value Ref Range    Prostate Specific Antigen Screen 5.09 (H) 0.00 - 4.50 ng/mL    Narrative    This result is obtained using the Roche Elecsys total PSA method on the nick [id number] immunoassay analyzer. Results obtained with different assay methods or kits cannot be used interchangeably.   TSH with free T4 reflex     Status: Normal   Result Value Ref Range    TSH 1.78 0.30 - 4.20 uIU/mL   Hemoglobin A1c     Status: Normal   Result Value Ref Range    Hemoglobin A1C 5.5 0.0 - 5.6 %   Basic metabolic panel  (Ca, Cl, CO2, Creat, Gluc, K, Na, BUN)     Status: Abnormal   Result Value Ref Range    Sodium 141 135 - 145 mmol/L    Potassium 4.0 3.4 - 5.3 mmol/L    Chloride 100 98 - 107 mmol/L    Carbon Dioxide (CO2) 27 22 - 29 mmol/L    Anion Gap 14 7 - 15 mmol/L    Urea Nitrogen 13.6 8.0 - 23.0 mg/dL    Creatinine 1.09 0.67 - 1.17 mg/dL    GFR Estimate 76 >60 mL/min/1.73m2    Calcium 10.1 8.8 - 10.2 mg/dL    Glucose 109 (H) 70 - 99 mg/dL       ASSESSMENT/PLAN:   Aashish was seen today for physical, derm problem and imm/inj.    Diagnoses and all orders for this visit:    Routine history and physical examination of adult    Benign essential hypertension  -     Discontinue: lisinopril-hydrochlorothiazide (ZESTORETIC) 20-12.5 MG tablet; Take 1 tablet by mouth daily  -     lisinopril-hydrochlorothiazide (ZESTORETIC) 20-12.5 MG tablet; Take 1 tablet by mouth daily  -     Basic metabolic panel  (Ca, Cl, CO2, Creat, Gluc, K, Na, BUN); Future  -     Basic metabolic panel  (Ca, Cl, CO2, Creat, Gluc, K, Na, BUN)    Hyperlipidemia LDL goal <130  -     Lipid panel reflex to direct LDL Fasting; Future  -     Lipid panel reflex to direct LDL Fasting    Screening for prostate cancer  -     PSA, screen; Future  -     PSA, screen    Screening for thyroid disorder  -     TSH with free T4 reflex; Future  -     TSH with free T4 reflex    Screening for diabetes mellitus  -     Hemoglobin A1c; Future  -     Hemoglobin A1c    Needs flu  "shot    Skin lesion of back  -     Adult Dermatology  Referral; Future  Derm appointment scheduled for 4/11/24    Elevated prostate specific antigen (PSA)  -     Adult Urology  Referral; Future  Urology appointment scheduled for 2/6/24    Other orders  -     PRIMARY CARE FOLLOW-UP SCHEDULING  -     INFLUENZA VACCINE IM > 6 MONTHS VALENT IIV4 (AFLURIA/FLUZONE)          Patient has been advised of split billing requirements and indicates understanding: Yes      COUNSELING:   Reviewed preventive health counseling, as reflected in patient instructions  Special attention given to:        Regular exercise       Healthy diet/nutrition      BMI:   Estimated body mass index is 31.43 kg/m  as calculated from the following:    Height as of 12/12/22: 1.753 m (5' 9\").    Weight as of 12/12/22: 96.5 kg (212 lb 12.8 oz).   Weight management plan: Discussed healthy diet and exercise guidelines      He reports that he quit smoking about 23 years ago. His smoking use included cigarettes. He has never used smokeless tobacco.            Linette Hunter MD  [facility]    "

## 2023-12-19 NOTE — TELEPHONE ENCOUNTER
MEDICAL RECORDS REQUEST   [facility]  Urology Clinic  [address] [place] [address]  PHONE: [phone]  Fax: [phone]        FUTURE VISIT INFORMATION                                                   Aashish Aguilar, : 1960 scheduled for future visit at [facility]    APPOINTMENT INFORMATION:  Date: 2024  Provider:  Nolberto Perez PA-C  Reason for Visit/Diagnosis: Elevated prostate specific antigen (PSA)    REFERRAL INFORMATION:  Referring provider:  Linette Hunter MD in  [facility]      RECORDS REQUESTED FOR VISIT                                                     NOTES  STATUS/DETAILS   OFFICE NOTE from referring provider  yes, 2023 -- Linette Hunter MD in  [facility]   MEDICATION LIST  yes   LABS     psa  yes     PRE-VISIT CHECKLIST      Joint diagnostic appointment coordinated correctly          (ensure right order & amount of time) Yes   RECORD COLLECTION COMPLETE Yes

## 2023-12-21 NOTE — TELEPHONE ENCOUNTER
MEDICAL RECORDS REQUEST   [facility]  Urology Clinic  [address] [place] [address]  PHONE: [phone]  Fax: [phone]                                                    FUTURE VISIT INFORMATION                                                   Aashish Aguilar, : 1960 scheduled for future visit at [facility]     APPOINTMENT INFORMATION:  Date: 2023  Provider:  Arline Vences MD  Reason for Visit/Diagnosis: Elevated prostate specific antigen (PSA)     REFERRAL INFORMATION:  Referring provider:  Linette Hunter MD in  [facility]        RECORDS REQUESTED FOR VISIT                                                      NOTES   STATUS/DETAILS   OFFICE NOTE from referring provider   yes, 2023 -- Linette Hunter MD in  [facility]   MEDICATION LIST   yes   LABS       psa   yes      PRE-VISIT CHECKLIST        Joint diagnostic appointment coordinated correctly          (ensure right order & amount of time) Yes   RECORD COLLECTION COMPLETE Yes

## 2023-12-27 ENCOUNTER — PRE VISIT (OUTPATIENT)
Dept: UROLOGY | Facility: CLINIC | Age: 63
End: 2023-12-27

## 2024-02-06 ENCOUNTER — PRE VISIT (OUTPATIENT)
Dept: UROLOGY | Facility: CLINIC | Age: 64
End: 2024-02-06

## 2024-08-15 ENCOUNTER — OFFICE VISIT (OUTPATIENT)
Dept: DERMATOLOGY | Facility: CLINIC | Age: 64
End: 2024-08-15
Attending: INTERNAL MEDICINE
Payer: COMMERCIAL

## 2024-08-15 DIAGNOSIS — Z12.83 ENCOUNTER FOR SCREENING FOR MALIGNANT NEOPLASM OF SKIN: ICD-10-CM

## 2024-08-15 DIAGNOSIS — L81.4 LENTIGINES: ICD-10-CM

## 2024-08-15 DIAGNOSIS — L82.1 SEBORRHEIC KERATOSES: ICD-10-CM

## 2024-08-15 DIAGNOSIS — D22.9 MULTIPLE NEVI: Primary | ICD-10-CM

## 2024-08-15 PROCEDURE — 99203 OFFICE O/P NEW LOW 30 MIN: CPT | Performed by: PHYSICIAN ASSISTANT

## 2024-08-15 ASSESSMENT — PAIN SCALES - GENERAL: PAINLEVEL: NO PAIN (0)

## 2024-08-15 NOTE — PROGRESS NOTES
[facility] Dermatology Note  Encounter Date: Aug 15, 2024  Office Visit     Reviewed patients past medical history and pertinent chart review prior to patients visit today.     Dermatology Problem List:  [facility] 8/15/2024     No personal history of skin cancer.  No family history of skin cancer.  ____________________________________________    Assessment & Plan:     # Multiple nevi, trunk and extremities  # Solar lentigines  - No concerning features on dermoscopy. We discussed the importance of self exams at home. ABCDE criteria and importance of photoprotection reviewed.     # Acrochordons  # Seborrheic keratoses  - We discussed the benign nature of the skin lesions. No treatment required. Continued observation recommended. Follow up with any concerns.      # Calluses  - Discussed that this is a benign condition that is caused by pressure from underlying bony structures. I recommend paring down lesion when it gets thick or use of exfoliative creams such as urea to soften the thickened skin.     Follow-up:  Annual for follow up full body skin exam with PCP or dermatology, as needed for new or changing lesions or new concerns    All risks, benefits and alternatives were discussed with patient.  Patient is in agreement and understands the assessment and plan.  All questions were answered.  Beronica Esquivel PA-C  [facility]    ____________________________________________    CC: Derm Problem ([facility])    HPI:  Mr. Aashish Aguilar is a(n) 64 year old male who presents today as a return patient for a full body skin cancer screening. The patient requests evaluation of lesions on the back and right knee. No growth or changes over time. No pain, itching, or bleeding. No other specific cutaneous concerns today. The patient reports trying to be diligent with photoprotection.      Physical Exam:  Vitals: There were no vitals taken for this visit.  SKIN: Total skin excluding the genitalia areas was  performed. The exam included the scalp, face, neck, bilateral arms, chest, back, abdomen, bilateral legs, digits, mons pubis, buttocks, and nails.   - Obrien V.  - The neck demonstrates a fleshy, pedunculated papules, consistent with acrochordons.   - Multiple tan/brown macules and papules scattered throughout exam, consistent with benign nevi. No concerning features on dermoscopy.   - Scattered tan, homogenous macules scattered on sun exposed skin, consistent with solar lentigines.   - Scattered waxy, stuck on appearing papules and patches, consistent with seborrheic keratoses.    - Hyperkeratosis involving the plantar feet, consistent with callus.     Medications:  Current Outpatient Medications   Medication Sig Dispense Refill    lisinopril-hydrochlorothiazide (ZESTORETIC) 20-12.5 MG tablet Take 1 tablet by mouth daily 90 tablet 3    traZODone (DESYREL) 50 MG tablet TAKE 1 TABLET(50 MG) BY MOUTH AT BEDTIME 90 tablet 1    acetaminophen (TYLENOL) 325 MG tablet Take 2 tablets (650 mg) by mouth every 4 hours as needed for other (mild pain) 100 tablet 0    aspirin (ASA) 325 MG EC tablet Take 1 tablet (325 mg) by mouth daily 30 tablet 0    cephALEXin (KEFLEX) 500 MG capsule Take 1 capsule (500 mg) by mouth every 6 hours 20 capsule 0    ferrous sulfate (FEROSUL) 325 (65 Fe) MG tablet Take 325 mg by mouth daily (with breakfast)      ibuprofen (ADVIL/MOTRIN) 600 MG tablet Take 1 tablet (600 mg) by mouth every 6 hours as needed for mild pain 30 tablet 0    oxyCODONE (ROXICODONE) 5 MG tablet Take 1-2 tablets (5-10 mg) by mouth every 6 hours as needed for moderate to severe pain, moderate pain (4-6) or severe pain (7-10) 33 tablet 0    pravastatin (PRAVACHOL) 40 MG tablet TAKE 1 TABLET(40 MG) BY MOUTH DAILY 90 tablet 0    senna-docusate (SENOKOT-S/PERICOLACE) 8.6-50 MG tablet Take 1-2 tablets by mouth 2 times daily Take while on oral narcotics to prevent or treat constipation. 30 tablet 0     No current  facility-administered medications for this visit.      Past Medical History:   Patient Active Problem List   Diagnosis    Mixed hyperlipidemia    Essential hypertension    Obstructive sleep apnea    Esophageal reflux    Insomnia    Benign neoplasm of colon    HYPERLIPIDEMIA LDL GOAL <130    Morbid obesity (H)    Bilateral knee pain    Primary osteoarthritis of both knees     Past Medical History:   Diagnosis Date    History of colonic polyps     Hypertension        CC Linette Hunter MD  [address] [address] on close of this encounter.

## 2024-08-15 NOTE — LETTER
8/15/2024      Aashish Aguilar  [address]  [facility] [address]      Dear Colleague,    Thank you for referring your patient, Aashish Aguilar, to the [facility]. Please see a copy of my visit note below.    [facility] Dermatology Note  Encounter Date: Aug 15, 2024  Office Visit     Reviewed patients past medical history and pertinent chart review prior to patients visit today.     Dermatology Problem List:  [facility] 8/15/2024     No personal history of skin cancer.  No family history of skin cancer.  ____________________________________________    Assessment & Plan:     # Multiple nevi, trunk and extremities  # Solar lentigines  - No concerning features on dermoscopy. We discussed the importance of self exams at home. ABCDE criteria and importance of photoprotection reviewed.     # Acrochordons  # Seborrheic keratoses  - We discussed the benign nature of the skin lesions. No treatment required. Continued observation recommended. Follow up with any concerns.      # Calluses  - Discussed that this is a benign condition that is caused by pressure from underlying bony structures. I recommend paring down lesion when it gets thick or use of exfoliative creams such as urea to soften the thickened skin.     Follow-up:  Annual for follow up full body skin exam with PCP or dermatology, as needed for new or changing lesions or new concerns    All risks, benefits and alternatives were discussed with patient.  Patient is in agreement and understands the assessment and plan.  All questions were answered.  Beronica Esquivel PA-C  [facility]    ____________________________________________    CC: Derm Problem ([facility])    HPI:  Mr. Aashish Aguilar is a(n) 64 year old male who presents today as a return patient for a full body skin cancer screening. The patient requests evaluation of lesions on the back and right knee. No growth or changes over time. No pain, itching, or bleeding.  No other specific cutaneous concerns today. The patient reports trying to be diligent with photoprotection.      Physical Exam:  Vitals: There were no vitals taken for this visit.  SKIN: Total skin excluding the genitalia areas was performed. The exam included the scalp, face, neck, bilateral arms, chest, back, abdomen, bilateral legs, digits, mons pubis, buttocks, and nails.   - Obrien V.  - The neck demonstrates a fleshy, pedunculated papules, consistent with acrochordons.   - Multiple tan/brown macules and papules scattered throughout exam, consistent with benign nevi. No concerning features on dermoscopy.   - Scattered tan, homogenous macules scattered on sun exposed skin, consistent with solar lentigines.   - Scattered waxy, stuck on appearing papules and patches, consistent with seborrheic keratoses.    - Hyperkeratosis involving the plantar feet, consistent with callus.     Medications:  Current Outpatient Medications   Medication Sig Dispense Refill     lisinopril-hydrochlorothiazide (ZESTORETIC) 20-12.5 MG tablet Take 1 tablet by mouth daily 90 tablet 3     traZODone (DESYREL) 50 MG tablet TAKE 1 TABLET(50 MG) BY MOUTH AT BEDTIME 90 tablet 1     acetaminophen (TYLENOL) 325 MG tablet Take 2 tablets (650 mg) by mouth every 4 hours as needed for other (mild pain) 100 tablet 0     aspirin (ASA) 325 MG EC tablet Take 1 tablet (325 mg) by mouth daily 30 tablet 0     cephALEXin (KEFLEX) 500 MG capsule Take 1 capsule (500 mg) by mouth every 6 hours 20 capsule 0     ferrous sulfate (FEROSUL) 325 (65 Fe) MG tablet Take 325 mg by mouth daily (with breakfast)       ibuprofen (ADVIL/MOTRIN) 600 MG tablet Take 1 tablet (600 mg) by mouth every 6 hours as needed for mild pain 30 tablet 0     oxyCODONE (ROXICODONE) 5 MG tablet Take 1-2 tablets (5-10 mg) by mouth every 6 hours as needed for moderate to severe pain, moderate pain (4-6) or severe pain (7-10) 33 tablet 0     pravastatin (PRAVACHOL) 40 MG tablet TAKE 1  TABLET(40 MG) BY MOUTH DAILY 90 tablet 0     senna-docusate (SENOKOT-S/PERICOLACE) 8.6-50 MG tablet Take 1-2 tablets by mouth 2 times daily Take while on oral narcotics to prevent or treat constipation. 30 tablet 0     No current facility-administered medications for this visit.      Past Medical History:   Patient Active Problem List   Diagnosis     Mixed hyperlipidemia     Essential hypertension     Obstructive sleep apnea     Esophageal reflux     Insomnia     Benign neoplasm of colon     HYPERLIPIDEMIA LDL GOAL <130     Morbid obesity (H)     Bilateral knee pain     Primary osteoarthritis of both knees     Past Medical History:   Diagnosis Date     History of colonic polyps      Hypertension        CC Linette Nolberto Hunter MD  [address] [address] on close of this encounter.      Again, thank you for allowing me to participate in the care of your patient.        Sincerely,        Beronica Esquivel PA-C

## 2024-08-15 NOTE — PATIENT INSTRUCTIONS
Proper skin care from [facility]:    -Eliminate harsh soaps as they strip the natural oils from the skin, often resulting in dry itchy skin ( i.e. Dial, Zest, [place])  -Use mild soaps such as Cetaphil or Dove Sensitive Skin in the shower. You do not need to use soap on arms, legs, and trunk every time you shower unless visibly soiled.   -Avoid hot or cold showers.  -After showering, lightly dry off and apply moisturizing within 2-3 minutes. This will help trap moisture in the skin.   -Aggressive use of a moisturizer at least 1-2 times a day to the entire body (including -Vanicream, Cetaphil, Aquaphor or Cerave) and moisturize hands after every washing.  -We recommend using moisturizers that come in a tub that needs to be scooped out, not a pump. This has more of an oil base. It will hold moisture in your skin much better than a water base moisturizer. The above recommended are non-pore clogging.      Wear a sunscreen with at least SPF 30 on your face, ears, neck and V of the chest daily. Wear sunscreen on other areas of the body if those areas are exposed to the sun throughout the day. Sunscreens can contain physical and/or chemical blockers. Physical blockers are less likely to clog pores, these include zinc oxide and titanium dioxide. Reapply every two hour and after swimming.     Sunscreen examples: https://www.ewg.org/sunscreen/    UV radiation  UVA radiation remains constant throughout the day and throughout the year. It is a longer wavelength than UVB and therefore penetrates deeper into the skin leading to immediate and delayed tanning, photoaging, and skin cancer. 70-80% of UVA and UVB radiation occurs between the hours of 10am-2pm.  UVB radiation  UVB radiation causes the most harmful effects and is more significant during the summer months. However, snow and ice can reflect UVB radiation leading to skin damage during the winter months as well. UVB radiation is responsible for tanning,  burning, inflammation, delayed erythema (pinkness), pigmentation (brown spots), and skin cancer.     I recommend self monthly full body exams and yearly full body exams with a dermatology provider. If you develop a new or changing lesion please follow up for examination. Most skin cancers are pink and scaly or pink and pearly. However, we do see blue/brown/black skin cancers.  Consider the ABCDEs of melanoma when giving yourself your monthly full body exam ( don't forget the groin, buttocks, feet, toes, etc). A-asymmetry, B-borders, C-color, D-diameter, E-elevation or evolving. If you see any of these changes please follow up in clinic. If you cannot see your back I recommend purchasing a hand held mirror to use with a larger wall mirror.       Checking for Skin Cancer  You can find cancer early by checking your skin each month. There are 3 kinds of skin cancer. They are melanoma, basal cell carcinoma, and squamous cell carcinoma. Doing monthly skin checks is the best way to find new marks or skin changes. Follow the instructions below for checking your skin.   The ABCDEs of checking moles for melanoma   Check your moles or growths for signs of melanoma using ABCDE:   Asymmetry: the sides of the mole or growth don t match  Border: the edges are ragged, notched, or blurred  Color: the color within the mole or growth varies  Diameter: the mole or growth is larger than 6 mm (size of a pencil eraser)  Evolving: the size, shape, or color of the mole or growth is changing (evolving is not shown in the images below)    Checking for other types of skin cancer  Basal cell carcinoma or squamous cell carcinoma have symptoms such as:     A spot or mole that looks different from all other marks on your skin  Changes in how an area feels, such as itching, tenderness, or pain  Changes in the skin's surface, such as oozing, bleeding, or scaliness  A sore that does not heal  New swelling or redness beyond the border of a  mole    Who s at risk?  Anyone can get skin cancer. But you are at greater risk if you have:   Fair skin, light-colored hair, or light-colored eyes  Many moles or abnormal moles on your skin  A history of sunburns from sunlight or tanning beds  A family history of skin cancer  A history of exposure to radiation or chemicals  A weakened immune system  If you have had skin cancer in the past, you are at risk for recurring skin cancer.   How to check your skin  Do your monthly skin checkups in front of a full-length mirror. Check all parts of your body, including your:   Head (ears, face, neck, and scalp)  Torso (front, back, and sides)  Arms (tops, undersides, upper, and lower armpits)  Hands (palms, backs, and fingers, including under the nails)  Buttocks and genitals  Legs (front, back, and sides)  Feet (tops, soles, toes, including under the nails, and between toes)  If you have a lot of moles, take digital photos of them each month. Make sure to take photos both up close and from a distance. These can help you see if any moles change over time.   Most skin changes are not cancer. But if you see any changes in your skin, call your doctor right away. Only he or she can diagnose a problem. If you have skin cancer, seeing your doctor can be the first step toward getting the treatment that could save your life.   [employer] last reviewed this educational content on 4/1/2019 2000-2020 [employer]. [address] [place] [address], [place] [address]. All rights reserved. This information is not intended as a substitute for professional medical care. Always follow your healthcare professional's instructions.       When should I call my doctor?  If you are worsening or not improving, please, contact us or seek urgent care as noted below.     Who should I call with questions (adults)?    [facility] [phone]  For urgent needs outside of business hours call the [facility] at  [phone] and ask for the dermatology resident on call to be paged  If this is a medical emergency and you are unable to reach an ER, Call 911      If you need a prescription refill, please contact your pharmacy. Refills are approved or denied by our Physicians during normal business hours, Monday through Fridays  Per office policy, refills will not be granted if you have not been seen within the past year (or sooner depending on your child's condition)

## 2024-12-24 ENCOUNTER — OFFICE VISIT (OUTPATIENT)
Dept: FAMILY MEDICINE | Facility: CLINIC | Age: 64
End: 2024-12-24
Payer: COMMERCIAL

## 2024-12-24 VITALS
RESPIRATION RATE: 16 BRPM | HEART RATE: 65 BPM | BODY MASS INDEX: 33.03 KG/M2 | WEIGHT: 223 LBS | SYSTOLIC BLOOD PRESSURE: 134 MMHG | OXYGEN SATURATION: 100 % | TEMPERATURE: 98.4 F | HEIGHT: 69 IN | DIASTOLIC BLOOD PRESSURE: 78 MMHG

## 2024-12-24 DIAGNOSIS — R20.0 NUMBNESS OF LEFT FOOT: ICD-10-CM

## 2024-12-24 DIAGNOSIS — Z13.6 SCREENING FOR AAA (ABDOMINAL AORTIC ANEURYSM): ICD-10-CM

## 2024-12-24 DIAGNOSIS — R22.1 NECK MASS: ICD-10-CM

## 2024-12-24 DIAGNOSIS — Z00.00 ENCOUNTER FOR ANNUAL WELLNESS EXAM IN MEDICARE PATIENT: Primary | ICD-10-CM

## 2024-12-24 DIAGNOSIS — I10 BENIGN ESSENTIAL HYPERTENSION: ICD-10-CM

## 2024-12-24 DIAGNOSIS — E78.5 HYPERLIPIDEMIA LDL GOAL <130: ICD-10-CM

## 2024-12-24 DIAGNOSIS — Z12.5 SCREENING FOR PROSTATE CANCER: ICD-10-CM

## 2024-12-24 DIAGNOSIS — D70.9 NEUTROPENIA, UNSPECIFIED TYPE (H): ICD-10-CM

## 2024-12-24 PROBLEM — E66.01 MORBID OBESITY (H): Status: RESOLVED | Noted: 2020-06-15 | Resolved: 2024-12-24

## 2024-12-24 LAB
ALBUMIN SERPL BCG-MCNC: 4.5 G/DL (ref 3.5–5.2)
ALP SERPL-CCNC: 81 U/L (ref 40–150)
ALT SERPL W P-5'-P-CCNC: 33 U/L (ref 0–70)
ANION GAP SERPL CALCULATED.3IONS-SCNC: 12 MMOL/L (ref 7–15)
AST SERPL W P-5'-P-CCNC: 30 U/L (ref 0–45)
BASOPHILS # BLD AUTO: 0 10E3/UL (ref 0–0.2)
BASOPHILS NFR BLD AUTO: 1 %
BILIRUB SERPL-MCNC: 0.5 MG/DL
BUN SERPL-MCNC: 16.1 MG/DL (ref 8–23)
CALCIUM SERPL-MCNC: 9.4 MG/DL (ref 8.8–10.4)
CHLORIDE SERPL-SCNC: 101 MMOL/L (ref 98–107)
CHOLEST SERPL-MCNC: 295 MG/DL
CREAT SERPL-MCNC: 1.04 MG/DL (ref 0.67–1.17)
EGFRCR SERPLBLD CKD-EPI 2021: 80 ML/MIN/1.73M2
EOSINOPHIL # BLD AUTO: 0.2 10E3/UL (ref 0–0.7)
EOSINOPHIL NFR BLD AUTO: 6 %
ERYTHROCYTE [DISTWIDTH] IN BLOOD BY AUTOMATED COUNT: 14 % (ref 10–15)
EST. AVERAGE GLUCOSE BLD GHB EST-MCNC: 117 MG/DL
FASTING STATUS PATIENT QL REPORTED: YES
FASTING STATUS PATIENT QL REPORTED: YES
GLUCOSE SERPL-MCNC: 111 MG/DL (ref 70–99)
HBA1C MFR BLD: 5.7 % (ref 0–5.6)
HCO3 SERPL-SCNC: 26 MMOL/L (ref 22–29)
HCT VFR BLD AUTO: 43.8 % (ref 40–53)
HDLC SERPL-MCNC: 87 MG/DL
HGB BLD-MCNC: 14.6 G/DL (ref 13.3–17.7)
IMM GRANULOCYTES # BLD: 0 10E3/UL
IMM GRANULOCYTES NFR BLD: 0 %
LDLC SERPL CALC-MCNC: 193 MG/DL
LYMPHOCYTES # BLD AUTO: 1.2 10E3/UL (ref 0.8–5.3)
LYMPHOCYTES NFR BLD AUTO: 41 %
MCH RBC QN AUTO: 31.5 PG (ref 26.5–33)
MCHC RBC AUTO-ENTMCNC: 33.3 G/DL (ref 31.5–36.5)
MCV RBC AUTO: 94 FL (ref 78–100)
MONOCYTES # BLD AUTO: 0.3 10E3/UL (ref 0–1.3)
MONOCYTES NFR BLD AUTO: 10 %
NEUTROPHILS # BLD AUTO: 1.2 10E3/UL (ref 1.6–8.3)
NEUTROPHILS NFR BLD AUTO: 42 %
NONHDLC SERPL-MCNC: 208 MG/DL
PLATELET # BLD AUTO: 267 10E3/UL (ref 150–450)
POTASSIUM SERPL-SCNC: 4 MMOL/L (ref 3.4–5.3)
PROT SERPL-MCNC: 7.1 G/DL (ref 6.4–8.3)
PSA SERPL DL<=0.01 NG/ML-MCNC: 4.2 NG/ML (ref 0–4.5)
RBC # BLD AUTO: 4.64 10E6/UL (ref 4.4–5.9)
SODIUM SERPL-SCNC: 139 MMOL/L (ref 135–145)
TRIGL SERPL-MCNC: 77 MG/DL
TSH SERPL DL<=0.005 MIU/L-ACNC: 1.73 UIU/ML (ref 0.3–4.2)
WBC # BLD AUTO: 2.9 10E3/UL (ref 4–11)

## 2024-12-24 PROCEDURE — 99214 OFFICE O/P EST MOD 30 MIN: CPT | Mod: 25 | Performed by: PHYSICIAN ASSISTANT

## 2024-12-24 PROCEDURE — 85025 COMPLETE CBC W/AUTO DIFF WBC: CPT | Performed by: PHYSICIAN ASSISTANT

## 2024-12-24 PROCEDURE — 84443 ASSAY THYROID STIM HORMONE: CPT | Performed by: PHYSICIAN ASSISTANT

## 2024-12-24 PROCEDURE — 36415 COLL VENOUS BLD VENIPUNCTURE: CPT | Performed by: PHYSICIAN ASSISTANT

## 2024-12-24 PROCEDURE — G0402 INITIAL PREVENTIVE EXAM: HCPCS | Performed by: PHYSICIAN ASSISTANT

## 2024-12-24 PROCEDURE — 80053 COMPREHEN METABOLIC PANEL: CPT | Performed by: PHYSICIAN ASSISTANT

## 2024-12-24 PROCEDURE — 83036 HEMOGLOBIN GLYCOSYLATED A1C: CPT | Mod: GZ | Performed by: PHYSICIAN ASSISTANT

## 2024-12-24 PROCEDURE — 80061 LIPID PANEL: CPT | Performed by: PHYSICIAN ASSISTANT

## 2024-12-24 PROCEDURE — G0103 PSA SCREENING: HCPCS | Performed by: PHYSICIAN ASSISTANT

## 2024-12-24 SDOH — HEALTH STABILITY: PHYSICAL HEALTH: ON AVERAGE, HOW MANY DAYS PER WEEK DO YOU ENGAGE IN MODERATE TO STRENUOUS EXERCISE (LIKE A BRISK WALK)?: 5 DAYS

## 2024-12-24 ASSESSMENT — SOCIAL DETERMINANTS OF HEALTH (SDOH): HOW OFTEN DO YOU GET TOGETHER WITH FRIENDS OR RELATIVES?: ONCE A WEEK

## 2024-12-24 ASSESSMENT — PAIN SCALES - GENERAL: PAINLEVEL_OUTOF10: NO PAIN (0)

## 2024-12-24 NOTE — PROGRESS NOTES
"Preventive Care Visit  [facility] KWASI Terry PA-C, Physician Assistant - Medical  Dec 24, 2024      Assessment & Plan     Encounter for annual wellness exam in Medicare patient  Annual labs ordered. Healthy diet and exercise reviewed. Recommended routine dental, eye, and skin screenings. AAA screening ordered.     - CBC with platelets and differential  - Comprehensive metabolic panel (BMP + Alb, Alk Phos, ALT, AST, Total. Bili, TP)  - TSH with free T4 reflex  - PSA, screen  - Hemoglobin A1c      Benign essential hypertension  Well-controlled on lisinopril-hydrochlorothiazide.  Labs today  - CBC with platelets and differential  - Comprehensive metabolic panel (BMP + Alb, Alk Phos, ALT, AST, Total. Bili, TP)  - TSH with free T4 reflex    Hyperlipidemia LDL goal <130  Previously on statins.  Repeat lipid panel.  - Lipid panel reflex to direct LDL Fasting    Screening for prostate cancer  Reviewed PSA  - PSA, screen    Numbness of left foot   Lafleur's neuroma?  No obvious abnormality.  Labs today.  Referral placed to podiatry for further evaluation.  - Orthopedic  Referral    Screening for AAA (abdominal aortic aneurysm)  Aortic aneurysm screening ordered  -  Aorta Medicare AAA Screening    Neck mass  Submandibular swelling.  Evaluate further with soft tissue ultrasound.  Gave him number to call and schedule.  - US Head Neck Soft Tissue    Patient has been advised of split billing requirements and indicates understanding: Yes      40 minutes spent by me on the date of the encounter doing chart review, review of test results, interpretation of tests, patient visit, and documentation     BMI  Estimated body mass index is 32.93 kg/m  as calculated from the following:    Height as of this encounter: 1.753 m (5' 9\").    Weight as of this encounter: 101.2 kg (223 lb).   Weight management plan: Discussed healthy diet and exercise guidelines    Counseling  Appropriate preventive services " were addressed with this patient via screening, questionnaire, or discussion as appropriate for fall prevention, nutrition, physical activity, Tobacco-use cessation, social engagement, weight loss and cognition.  Checklist reviewing preventive services available has been given to the patient.  Reviewed patient's diet, addressing concerns and/or questions.   Information on urinary incontinence and treatment options given to patient.         Subjective   Aashish is a very pleasant 64 year old, presenting for the following:  Musculoskeletal Problem and Physical    Here today for his annual wellness visit.  Retired.  Previously worked for [employer]. Staying very active with going to the gym 4 times a week.     -Did follow-up with urology last year.  Due for PSA repeat.    -Left foot pain/numbness overlying the first/second digit.  Made better with rolling of his foot with a tennis ball.  No inciting injury/trauma.  No signs of diabetes history of diabetes.    -Additionally, notes some submandibular swelling on the left side.  Present over the past 2 to 3 months. Recent illness back in October however feels well.  No fever, chills, night sweats.  No unintentional weight loss.    Eye exam: R; 20/32 L: 10/8 B: 20/16    Health Care Directive  Patient does not have a Health Care Directive: Patient states has Advance Directive and will bring in a copy to clinic.      12/24/2024   General Health   How would you rate your overall physical health? Good   Feel stress (tense, anxious, or unable to sleep) Not at all         12/24/2024   Nutrition   Diet: Regular (no restrictions)         12/24/2024   Exercise   Days per week of moderate/strenous exercise 5 days         12/24/2024   Social Factors   Frequency of gathering with friends or relatives Once a week   Worry food won't last until get money to buy more No   Food not last or not have enough money for food? No   Do you have housing? (Housing is defined as stable permanent  housing and does not include staying ouside in a car, in a tent, in an abandoned building, in an overnight shelter, or couch-surfing.) Yes   Are you worried about losing your housing? No   Lack of transportation? No   Unable to get utilities (heat,electricity)? No         2024   Fall Risk   Fallen 2 or more times in the past year? No   Trouble with walking or balance? No          2024   Activities of Daily Living- Home Safety   Needs help with the following daily activites None of the above   Safety concerns in the home None of the above         2024   Dental   Dentist two times every year? Yes         2024   Hearing Screening   Hearing concerns? None of the above         2024   Driving Risk Screening   Patient/family members have concerns about driving No         2024   General Alertness/Fatigue Screening   Have you been more tired than usual lately? No         2024   Urinary Incontinence Screening   Bothered by leaking urine in past 6 months Yes         2024   TB Screening   Were you born outside of the [place]? No         Today's PHQ-2 Score:       2024     7:16 AM   PHQ-2 ( 1999 [employer])   Q1: Little interest or pleasure in doing things 0   Q2: Feeling down, depressed or hopeless 0   PHQ-2 Score 0    Q1: Little interest or pleasure in doing things Not at all   Q2: Feeling down, depressed or hopeless Not at all   PHQ-2 Score 0       Patient-reported           2024   Substance Use   Alcohol more than 3/day or more than 7/wk No   Do you have a current opioid prescription? No   How severe/bad is pain from 1 to 10? 0/10 (No Pain)   Do you use any other substances recreationally? (!) CANNABIS PRODUCTS     Social History     Tobacco Use    Smoking status: Former     Current packs/day: 0.00     Types: Cigarettes     Quit date: 2000     Years since quittin.7    Smokeless tobacco: Never   Vaping Use    Vaping status: Never Used   Substance Use Topics     Alcohol use: Yes     Comment: 1drink a day    Drug use: Yes     Types: Marijuana       Last PSA:   PSA   Date Value Ref Range Status   06/07/2021 2.17 0 - 4 ug/L Final     Comment:     Assay Method:  Chemiluminescence using Siemens Vista analyzer     Prostate Specific Antigen Screen   Date Value Ref Range Status   12/08/2023 5.09 (H) 0.00 - 4.50 ng/mL Final   08/31/2022 2.66 0.00 - 4.00 ug/L Final     ASCVD Risk   The ASCVD Risk score (Pako DIAS, et al., 2019) failed to calculate for the following reasons:    The systolic blood pressure is missing          Reviewed and updated as needed this visit by Provider                    Past Medical History:   Diagnosis Date    History of colonic polyps     Hypertension      Past Surgical History:   Procedure Laterality Date    ARTHROPLASTY KNEE Left 10/17/2022    Procedure: LEFT TOTAL KNEE ARTHROPLASTY;  Surgeon: Kirt Ward MD;  Location:  OR    ARTHROPLASTY KNEE Right 12/12/2022    Procedure: RIGHT TOTAL KNEE ARTHROPLASTY;  Surgeon: Kirt Ward MD;  Location:  OR    COLONOSCOPY      COLONOSCOPY N/A 9/8/2022    Procedure: COLONOSCOPY, FLEXIBLE, WITH LESION REMOVAL USING SNARE;  Surgeon: Merna Méndez MD;  Location:  GI    [facility] NONSPECIFIC PROCEDURE  01/01/1991    Left knee arthroscopy -- Abstracted 5/30/02    ZZ NONSPECIFIC PROCEDURE  04/12/2002    Right knee arthroscopy -- Abstracted 5/30/02     Lab work is in process  Labs reviewed in EPIC  BP Readings from Last 3 Encounters:   12/24/24 134/78   12/08/23 130/85   12/13/22 97/59    Wt Readings from Last 3 Encounters:   12/24/24 101.2 kg (223 lb)   12/08/23 98 kg (216 lb)   12/12/22 96.5 kg (212 lb 12.8 oz)                  Patient Active Problem List   Diagnosis    Mixed hyperlipidemia    Essential hypertension    Obstructive sleep apnea    Esophageal reflux    Insomnia    Benign neoplasm of colon    HYPERLIPIDEMIA LDL GOAL <130    Morbid obesity (H)    Bilateral knee pain    Primary  osteoarthritis of both knees     Past Surgical History:   Procedure Laterality Date    ARTHROPLASTY KNEE Left 10/17/2022    Procedure: LEFT TOTAL KNEE ARTHROPLASTY;  Surgeon: Kirt Ward MD;  Location:  OR    ARTHROPLASTY KNEE Right 2022    Procedure: RIGHT TOTAL KNEE ARTHROPLASTY;  Surgeon: Kirt Ward MD;  Location:  OR    COLONOSCOPY      COLONOSCOPY N/A 2022    Procedure: COLONOSCOPY, FLEXIBLE, WITH LESION REMOVAL USING SNARE;  Surgeon: Merna Méndez MD;  Location:  GI    Z NONSPECIFIC PROCEDURE  1991    Left knee arthroscopy -- Abstracted 02    ZZC NONSPECIFIC PROCEDURE  2002    Right knee arthroscopy -- Abstracted 02       Social History     Tobacco Use    Smoking status: Former     Current packs/day: 0.00     Types: Cigarettes     Quit date: 2000     Years since quittin.7    Smokeless tobacco: Never   Substance Use Topics    Alcohol use: Yes     Comment: 1drink a day, less     Family History   Problem Relation Age of Onset    Coronary Artery Disease Father     Diabetes Father     Diabetes Sister         adult onset    Diabetes Sister         adult onset    Hypertension Sister     Hypertension Sister     Breast Cancer Sister         remission    Lupus Brother     Hypertension Brother     Cancer Brother         brain cancer         Current Outpatient Medications   Medication Sig Dispense Refill    lisinopril-hydrochlorothiazide (ZESTORETIC) 20-12.5 MG tablet Take 1 tablet by mouth daily 90 tablet 3    traZODone (DESYREL) 50 MG tablet TAKE 1 TABLET(50 MG) BY MOUTH AT BEDTIME 90 tablet 1     Allergies   Allergen Reactions    No Known Drug Allergy      Recent Labs   Lab Test 24  0807 23  1343 22  1456 10/17/22  0619 22  1319 22  0941 22  1600 21  0851   A1C 5.7* 5.5  --   --  5.4  --    < >  --    LDL  --  174*  --   --  220* 186*  --  132*   HDL  --  81  --   --  106 131  --  101   TRIG  --  74  --   --   76 43  --  51   ALT  --   --   --   --  38  --   --  44   CR  --  1.09 0.96   < > 1.03  --    < > 1.02   GFRESTIMATED  --  76 89   < > 82  --    < > 78   GFRESTBLACK  --   --   --   --   --   --   --  >90   POTASSIUM  --  4.0 4.3   < > 3.9  --    < > 4.6   TSH  --  1.78  --   --  1.88  --    < > 1.09    < > = values in this interval not displayed.      Current providers sharing in care for this patient include:  Patient Care Team:  Linette Hunter MD as PCP - General (Internal Medicine)  Beronica Esquivel PA-C as Physician Assistant (Dermatology)  Nolberto Perez PA-C as Physician Assistant (Physician Assistant - Medical)  Linette Hunter MD as Assigned PCP  Beronica Esquivel PA-C as Assigned Surgical Provider    The following health maintenance items are reviewed in Epic and correct as of today:  Health Maintenance   Topic Date Due    ZOSTER IMMUNIZATION (1 of 2) Never done    ANNUAL REVIEW OF HM ORDERS  08/31/2023    DTAP/TDAP/TD IMMUNIZATION (2 - Td or Tdap) 05/30/2024    AORTIC ANEURYSM SCREENING (SYSTEM ASSIGNED)  Never done    BMP  12/08/2024    LIPID  12/08/2024    MEDICARE ANNUAL WELLNESS VISIT  12/08/2024    FALL RISK ASSESSMENT  12/24/2025    GLUCOSE  12/08/2026    ADVANCE CARE PLANNING  11/15/2027    COLORECTAL CANCER SCREENING  09/08/2032    RSV VACCINE (1 - 1-dose 75+ series) 06/20/2034    HEPATITIS C SCREENING  Completed    HIV SCREENING  Completed    PHQ-2 (once per calendar year)  Completed    INFLUENZA VACCINE  Completed    Pneumococcal Vaccine: 50+ Years  Completed    COVID-19 Vaccine  Completed    HPV IMMUNIZATION  Aged Out    MENINGITIS IMMUNIZATION  Aged Out    RSV MONOCLONAL ANTIBODY  Aged Out         Review of Systems  Constitutional, neuro, ENT, endocrine, pulmonary, cardiac, gastrointestinal, genitourinary, musculoskeletal, integument and psychiatric systems are negative, except as otherwise noted.     Objective    Exam  There were no vitals taken for this visit.   Estimated  "body mass index is 31.9 kg/m  as calculated from the following:    Height as of 12/8/23: 1.753 m (5' 9\").    Weight as of 12/8/23: 98 kg (216 lb).    Physical Exam  GENERAL: alert and no distress  EYES: Eyes grossly normal to inspection, PERRL and conjunctivae and sclerae normal  HENT: ear canals and TM's normal, nose and mouth without ulcers or lesions  NECK: Unspecified left-sided submandibular swelling.  Firm.  No additional adenopathy.  RESP: lungs clear to auscultation - no rales, rhonchi or wheezes  CV: regular rate and rhythm, normal S1 S2, no S3 or S4, no murmur, click or rub, no peripheral edema  ABDOMEN: soft, nontender, no hepatosplenomegaly, no masses and bowel sounds normal  MS: no gross musculoskeletal defects noted, no edema.   Prominent MTP joint.  Sensation intact.  Strong distal pulses.  SKIN: no suspicious lesions or rashes  NEURO: Normal strength and tone, mentation intact and speech normal  PSYCH: mentation appears normal, affect normal/bright        12/24/2024   Mini Cog   Clock Draw Score 2 Normal   3 Item Recall 1 object recalled   Mini Cog Total Score 3         Vision Screen         Signed Electronically by: Laci Terry PA-C    "

## 2024-12-25 NOTE — RESULT ENCOUNTER NOTE
Devang Zendejas,    It was great meeting you for your annual physical!     -Stable hemoglobin. No signs of anemia. However your white blood cell counts are low, I would recommend repeating in 2 months. I will place the orders and you can schedule lab only appointment.     -Cholesterol levels are very elevated. LDL ideally should be under <100 and you are at 193. Up 20 pts from last year. I would recommend restarting a statin again such as atorvastatin to help mitigate your risk for a plaque related event:       .The 10-year ASCVD risk score (Pako DIAS, et al., 2019) is: 16.6%    Values used to calculate the score:      Age: 64 years      Sex: Male      Is Non- : Yes      Diabetic: No      Tobacco smoker: No      Systolic Blood Pressure: 134 mmHg      Is BP treated: Yes      HDL Cholesterol: 87 mg/dL      Total Cholesterol: 295 mg/dL    I can send this over to your pharmacy if you are interested.     -Your comprehensive metabolic panel which includes tests of liver function (ALT, AST, total bilirubin, alkaline phosphatase), kidney function (creatinine, BUN), electrolytes (sodium, potassium, calcium), and blood sugar (glucose) returned stable for you.    -A1c is 5.7% which is consistent with pre-diabetes. Focus on a healthy diet/exercise. Plan to repeat next year.     -PSA down to 4.20    -TSH (thyroid stimulating hormone) level is normal which indicates normal circulating thyroid hormone levels.     Let me know if you have any questions or concerns,     PASHA Rodríguez [facility]

## 2024-12-27 ENCOUNTER — ANCILLARY PROCEDURE (OUTPATIENT)
Dept: ULTRASOUND IMAGING | Facility: CLINIC | Age: 64
End: 2024-12-27
Attending: PHYSICIAN ASSISTANT
Payer: COMMERCIAL

## 2024-12-27 DIAGNOSIS — Z13.6 SCREENING FOR AAA (ABDOMINAL AORTIC ANEURYSM): ICD-10-CM

## 2024-12-27 DIAGNOSIS — R22.1 NECK MASS: ICD-10-CM

## 2024-12-27 PROCEDURE — 76706 US ABDL AORTA SCREEN AAA: CPT

## 2024-12-27 PROCEDURE — 76536 US EXAM OF HEAD AND NECK: CPT

## 2024-12-27 NOTE — RESULT ENCOUNTER NOTE
[facility],    This is Elsi Hernandez and I am a Nurse Practitioner who is covering for Laci Terry who is currently out of the office. I had a chance to review your recent screening ultrasound which is negative for an abdominal aortic aneurysm.  However, did note some plaque in one of the vessels.  Please schedule follow-up to further discuss treatment for this.    Let me know if you have any questions or concerns,     Elsi Hernandez DNP, APRKO  [facility]

## 2024-12-27 NOTE — RESULT ENCOUNTER NOTE
Devang Zendejas,    This is Elsi Hernandez and I am a Nurse Practitioner who is covering for Laci Terry who is currently out of the office. I had a chance to review your recent ultrasound which looks normal and is without any acute concerning findings.    Let me know if you have any questions or concerns,     Elsi Hernandez DNP, [facility]

## 2025-04-12 ENCOUNTER — HEALTH MAINTENANCE LETTER (OUTPATIENT)
Age: 65
End: 2025-04-12

## 2025-08-26 DIAGNOSIS — I10 BENIGN ESSENTIAL HYPERTENSION: ICD-10-CM

## 2025-08-26 RX ORDER — LISINOPRIL AND HYDROCHLOROTHIAZIDE 12.5; 2 MG/1; MG/1
1 TABLET ORAL DAILY
Qty: 90 TABLET | Refills: 0 | Status: SHIPPED | OUTPATIENT
Start: 2025-08-26

## (undated) DEVICE — SU VICRYL 0 CP-1 27" J467H

## (undated) DEVICE — TOURNIQUET SGL  BLADDER 30"X4" BLUE 5921030135

## (undated) DEVICE — SU ETHIBOND 0 CTX CR  8X18" CX31D

## (undated) DEVICE — SOL NACL 0.9% IRRIG 1000ML BOTTLE 2F7124

## (undated) DEVICE — STPL SKIN 35W 6.9MM  PXW35

## (undated) DEVICE — GLOVE BIOGEL PI MICRO INDICATOR UNDERGLOVE SZ 8.0 48980

## (undated) DEVICE — SOL WATER IRRIG 1000ML BOTTLE 2F7114

## (undated) DEVICE — SU VICRYL 0 CT-1 27" UND J260H

## (undated) DEVICE — ESU GROUND PAD UNIVERSAL W/O CORD

## (undated) DEVICE — BLADE SAW SAGITTAL STRK 18X90X1.27MM HD SYS 6 6118-127-090

## (undated) DEVICE — HOOD FLYTE W/PEELAWAY 408-800-100

## (undated) DEVICE — BONE CEMENT MIXEVAC III HI VAC KIT  0206-015-000

## (undated) DEVICE — DRAIN ROUND W/RESERV KIT JACKSON PRATT 10FR 400ML SU130-402D

## (undated) DEVICE — STPL SKIN 35W ROTATING HEAD PRW35

## (undated) DEVICE — SUCTION IRR SYSTEM W/O TIP INTERPULSE HANDPIECE 0210-100-000

## (undated) DEVICE — PREP SKIN SCRUB TRAY 4461A

## (undated) DEVICE — PREP CHLORAPREP 26ML TINTED HI-LITE ORANGE 930815

## (undated) DEVICE — DRSG ADAPTIC 3X8" 6113

## (undated) DEVICE — DRSG KERLIX FLUFFS X5

## (undated) DEVICE — LINEN TOWEL PACK X5 5464

## (undated) DEVICE — PACK TOTAL KNEE SOP15TKFSD

## (undated) DEVICE — GLOVE BIOGEL PI SZ 8.0 40880

## (undated) DEVICE — KIT DRAIN CLOSED WOUND SUCTION MED 400ML RESVR

## (undated) DEVICE — SOL NACL 0.9% INJ 1000ML BAG 2B1324X

## (undated) DEVICE — DRSG ABDOMINAL 07 1/2X8" 7197D

## (undated) DEVICE — MANIFOLD NEPTUNE 4 PORT 700-20

## (undated) DEVICE — SU PDO 1 STRATAFIX 36X36CM CTX TAPERPOINT SXPD2B405

## (undated) DEVICE — SYR 10ML FINGER CONTROL W/O NDL 309695

## (undated) DEVICE — SU VICRYL 2-0 CP-1 27" UND J266H

## (undated) DEVICE — BONE CLEANING TIP INTERPULSE  0210-010-000

## (undated) DEVICE — NDL 18GA 1.5" 305196

## (undated) DEVICE — DRSG GAUZE 4X4" TRAY

## (undated) DEVICE — BLADE SAW SAGITTAL STRK 18X90X1.19MM HD SYS 6 6118-119-090

## (undated) DEVICE — BLADE SAW SAGITTAL STRK 18X90X1.37MM HD SYS 6 6118-137-090

## (undated) DEVICE — WRAP EZY KNEE

## (undated) DEVICE — NDL 22GA 1.5"

## (undated) DEVICE — BLADE SAW RECIP STRK 70X12.5X1.2MM 0277-096-281

## (undated) DEVICE — SYR 30ML LL W/O NDL 302832

## (undated) RX ORDER — PROPOFOL 10 MG/ML
INJECTION, EMULSION INTRAVENOUS
Status: DISPENSED
Start: 2022-10-17

## (undated) RX ORDER — PROPOFOL 10 MG/ML
INJECTION, EMULSION INTRAVENOUS
Status: DISPENSED
Start: 2022-12-12

## (undated) RX ORDER — DEXAMETHASONE SODIUM PHOSPHATE 4 MG/ML
INJECTION, SOLUTION INTRA-ARTICULAR; INTRALESIONAL; INTRAMUSCULAR; INTRAVENOUS; SOFT TISSUE
Status: DISPENSED
Start: 2022-10-17

## (undated) RX ORDER — VANCOMYCIN HYDROCHLORIDE 1 G/20ML
INJECTION, POWDER, LYOPHILIZED, FOR SOLUTION INTRAVENOUS
Status: DISPENSED
Start: 2022-12-12

## (undated) RX ORDER — TRANEXAMIC ACID 10 MG/ML
INJECTION, SOLUTION INTRAVENOUS
Status: DISPENSED
Start: 2022-10-17

## (undated) RX ORDER — CEFAZOLIN SODIUM/WATER 2 G/20 ML
SYRINGE (ML) INTRAVENOUS
Status: DISPENSED
Start: 2022-10-17

## (undated) RX ORDER — VANCOMYCIN HYDROCHLORIDE 1 G/20ML
INJECTION, POWDER, LYOPHILIZED, FOR SOLUTION INTRAVENOUS
Status: DISPENSED
Start: 2022-10-17

## (undated) RX ORDER — ONDANSETRON 2 MG/ML
INJECTION INTRAMUSCULAR; INTRAVENOUS
Status: DISPENSED
Start: 2022-10-17

## (undated) RX ORDER — GLYCOPYRROLATE 0.2 MG/ML
INJECTION, SOLUTION INTRAMUSCULAR; INTRAVENOUS
Status: DISPENSED
Start: 2022-10-17

## (undated) RX ORDER — FENTANYL CITRATE 50 UG/ML
INJECTION, SOLUTION INTRAMUSCULAR; INTRAVENOUS
Status: DISPENSED
Start: 2022-09-08

## (undated) RX ORDER — ONDANSETRON 2 MG/ML
INJECTION INTRAMUSCULAR; INTRAVENOUS
Status: DISPENSED
Start: 2022-12-12

## (undated) RX ORDER — LIDOCAINE HYDROCHLORIDE 20 MG/ML
INJECTION, SOLUTION EPIDURAL; INFILTRATION; INTRACAUDAL; PERINEURAL
Status: DISPENSED
Start: 2022-10-17